# Patient Record
Sex: MALE | Race: ASIAN | NOT HISPANIC OR LATINO | Employment: STUDENT | URBAN - METROPOLITAN AREA
[De-identification: names, ages, dates, MRNs, and addresses within clinical notes are randomized per-mention and may not be internally consistent; named-entity substitution may affect disease eponyms.]

---
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
## 2020-01-02 LAB — 25(OH)D3 SERPL-MCNC: 16.3 NG/ML (ref 30–80)

## 2020-01-03 NOTE — RESULT ENCOUNTER NOTE
Please mail labs to patient with this message.    Jareth Hernandez  I'm covering for Dr Batista while he is out of the office.  Your vitamin D level is low, and I do recommend restarting vitamin d 2000 units daily.  A prescription has been sent to your pharmacy for this medication.  It would be reasonable to recheck this again in 2-3 months to make sure that the level is coming up.  OSCAR Young MD

## 2020-01-21 ENCOUNTER — OFFICE VISIT (OUTPATIENT)
Dept: FAMILY MEDICINE | Facility: CLINIC | Age: 20
End: 2020-01-21
Payer: COMMERCIAL

## 2020-01-21 VITALS
TEMPERATURE: 98.7 F | DIASTOLIC BLOOD PRESSURE: 83 MMHG | HEART RATE: 76 BPM | HEIGHT: 64 IN | RESPIRATION RATE: 16 BRPM | WEIGHT: 130.2 LBS | BODY MASS INDEX: 22.23 KG/M2 | SYSTOLIC BLOOD PRESSURE: 121 MMHG | OXYGEN SATURATION: 97 %

## 2020-01-21 DIAGNOSIS — G40.509 NONINTRACTABLE EPILEPSY DUE TO EXTERNAL CAUSES, WITHOUT STATUS EPILEPTICUS (H): ICD-10-CM

## 2020-01-21 DIAGNOSIS — R50.9 FEBRILE ILLNESS: Primary | ICD-10-CM

## 2020-01-21 LAB
FLUAV AG UPPER RESP QL IA.RAPID: NEGATIVE
FLUBV AG UPPER RESP QL IA.RAPID: NEGATIVE

## 2020-01-21 RX ORDER — IBUPROFEN 200 MG
400 TABLET ORAL EVERY 6 HOURS PRN
Qty: 30 TABLET | Refills: 0 | Status: SHIPPED | OUTPATIENT
Start: 2020-01-21 | End: 2020-02-11

## 2020-01-21 RX ORDER — DEXTROMETHORPHAN POLISTIREX 30 MG/5ML
60 SUSPENSION ORAL AT BEDTIME
Qty: 89 ML | Refills: 0 | Status: SHIPPED | OUTPATIENT
Start: 2020-01-21 | End: 2020-02-11

## 2020-01-21 RX ORDER — DIAZEPAM ORAL SOLUTION (CONCENTRATE) 5 MG/ML
SOLUTION ORAL
Qty: 30 ML | Refills: 5 | Status: SHIPPED | OUTPATIENT
Start: 2020-01-21 | End: 2021-07-26

## 2020-01-21 ASSESSMENT — MIFFLIN-ST. JEOR: SCORE: 1520.55

## 2020-01-21 NOTE — PROGRESS NOTES
"Preceptor attestation:  Vital signs reviewed: /83   Pulse 76   Temp 98.7  F (37.1  C) (Oral)   Resp 16   Ht 1.632 m (5' 4.25\")   Wt 59.1 kg (130 lb 3.2 oz)   SpO2 97%   BMI 22.18 kg/m      Patient seen, evaluated, and discussed with the resident.  I have verified the content of the note, which accurately reflects my assessment of the patient and the plan of care.    Supervising physician: Saba Martinez MD  [facility]  "

## 2020-01-21 NOTE — NURSING NOTE
Due to patient being non-English speaking/uses sign language, an  was used for this visit. Only for face-to-face interpretation by an external agency, date and length of interpretation can be found on the scanned worksheet.     name:  Darius Leal  Agency: [employer]  Language: Sherron  Telephone number:   [phone]  Type of interpretation:  Face-to-face, Spoken
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
## 2020-01-21 NOTE — PATIENT INSTRUCTIONS
Jareth Hernandez,    Thank you for coming in today!  Your symptoms are likely caused by a virus.      1)  Continue Tylenol as needed.  2)  Ibuprofen 400 mg (2 tablets) every 6 hours with food for fever and headache.  3)  Delsym liquid 10 mL's at bedtime to help with cough.    Return in no improvement in 3-4 days.    Thank you again!    Sincerely,    Dr. Mitchell

## 2020-01-22 NOTE — RESULT ENCOUNTER NOTE
Discussed negative Flu swab with patient in clinic.  No further action required at this time.    Dr. Mitchell

## 2020-01-28 ENCOUNTER — OFFICE VISIT (OUTPATIENT)
Dept: FAMILY MEDICINE | Facility: CLINIC | Age: 20
End: 2020-01-28
Payer: COMMERCIAL

## 2020-01-28 VITALS
RESPIRATION RATE: 20 BRPM | BODY MASS INDEX: 20.59 KG/M2 | HEART RATE: 64 BPM | TEMPERATURE: 98.6 F | SYSTOLIC BLOOD PRESSURE: 117 MMHG | OXYGEN SATURATION: 99 % | HEIGHT: 64 IN | DIASTOLIC BLOOD PRESSURE: 74 MMHG | WEIGHT: 120.6 LBS

## 2020-01-28 DIAGNOSIS — J02.0 STREP THROAT: ICD-10-CM

## 2020-01-28 DIAGNOSIS — M54.50 CHRONIC MIDLINE LOW BACK PAIN WITHOUT SCIATICA: ICD-10-CM

## 2020-01-28 DIAGNOSIS — G40.509 NONINTRACTABLE EPILEPSY DUE TO EXTERNAL CAUSES, WITHOUT STATUS EPILEPTICUS (H): ICD-10-CM

## 2020-01-28 DIAGNOSIS — S06.9X9D TRAUMATIC BRAIN INJURY WITH LOSS OF CONSCIOUSNESS, SUBSEQUENT ENCOUNTER: ICD-10-CM

## 2020-01-28 DIAGNOSIS — G44.209 TENSION HEADACHE: Primary | ICD-10-CM

## 2020-01-28 DIAGNOSIS — G89.29 CHRONIC MIDLINE LOW BACK PAIN WITHOUT SCIATICA: ICD-10-CM

## 2020-01-28 RX ORDER — AMOXICILLIN 500 MG/1
1000 CAPSULE ORAL 2 TIMES DAILY
Qty: 28 CAPSULE | Refills: 0 | Status: SHIPPED | OUTPATIENT
Start: 2020-01-28 | End: 2020-02-11

## 2020-01-28 ASSESSMENT — PAIN SCALES - GENERAL: PAINLEVEL: NO PAIN (0)

## 2020-01-28 ASSESSMENT — MIFFLIN-ST. JEOR: SCORE: 1473.91

## 2020-01-28 NOTE — PROGRESS NOTES
"Subjective: Jareth Hernandez is a 19 year old who presents today for a few new concerns.  He has ST and cough for a few days.  It hurts to swallow solids and liquids.  This been going on for a day or 2.  He is also had chills and subjective fever.  He has not had a cough.  He has low back pain that comes and goes for a week.  He continues to have HAs and he takes Excedrin for this, one pill once a day.  It helps a little bit.  He has a significant acquired intellectual disability because of this as well as a chronic seizure disorder.  He takes Trileptal 3 tablets (a total of 900 mg)  twice a day.  On this regimen he has not had a seizure since May.  He states that he misses a dose maybe once a week.    He has not been able to get his diazepam refilled and the family is not sure why.  They have been the pharmacy twice now trying to pick this up.    PMHX/PSHX/MEDS/ALLERGIES/SHX/FHX reviewed and updated in Epic.   ROS:   General: No fevers, chills   Head: No headache   CV: No chest pain or palpitations.   Resp: No shortness of breath. No cough. No hemoptysis.   GI: No nausea or vomiting.  No constipation or diarrhea.  Objective: /74   Pulse 64   Temp 98.6  F (37  C) (Oral)   Resp 20   Ht 1.627 m (5' 4.06\")   Wt 54.7 kg (120 lb 9.6 oz)   SpO2 99%   BMI 20.67 kg/m     Gen: Well nourished and in NAD   HEENT: TMs normal color and landmarks, nasopharynx shows clear drainage.  Oropharynx shows tonsillitis with pus pockets noted in the tonsillar crypts.  Neck is supple with right-sided upper cervical lymphadenopathy.  CV: RRR - no murmurs, rubs, or gallups  Pulm: Clear to auscultation without wheezing or crackles  ABD: soft, nontender, BS intact  No rashes on inspection.   Patient has full flexion and extension bilaterally.  There is full rotatory and side flexion bilaterally.No spinous or paraspinous tenderness.  No sciatic notch tenderness.  SLR is negative bilaterally.  Sensation is intact to light touch throughout.  " Motor strength is 5/5 bilaterally with hip flexion and extension and at EHL.   Extrem: no cyanosis, edema or clubbing   Psych: Euthymic    Assessment/ Plan:  It appears that he has strep throat.  Clinically he meets all of the CENTOR criteria.  Will treat empirically.    The low back pain seems to be functional.  We will refer for physical therapy.    The headaches seem to be escalating.  We will have him fill out a headache diary for the next 2 weeks and then follow-up.  At that point we can determine if we want to try another abortive therapy such as a triptan or even consider daily prophylactic therapy.    From a seizure standpoint he is doing well.  He will continue the Trileptal at the current dosage.    1. Tension headache    2. Traumatic brain injury with loss of consciousness, subsequent encounter    3. 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 Chronic midline low back pain without sciatica  - PHYSICAL THERAPY REFERRAL; Future    5. Strep throat  - amoxicillin (AMOXIL) 500 MG capsule; Take 2 capsules (1,000 mg) by mouth 2 times daily for 7 days  Dispense: 28 capsule; Refill: 0      Total of 40 minutes was spent in face to face contact with patient with > 50% in counseling about his headaches and coordination of care.  Options for treatment and/or follow-up care were reviewed with the patient. Jareth Hernandez was engaged and actively involved in the decision making process. He verbalized understanding of the options discussed and was satisfied with the final plan.    RTC in 2 weeks for follow up or sooner if develops new or worsening symptoms.    Eriberto Batista MD

## 2020-01-28 NOTE — NURSING NOTE
Chief Complaint   Patient presents with     RECHECK     F/U on Headaches and Medication Refills Needed. Patient reports having fever and vomiting for the last 2-3 days      Brennan Moura CMA    Due to patient being non-English speaking/uses sign language, an  was used for this visit. Only for face-to-face interpretation by an external agency, date and length of interpretation can be found on the scanned worksheet.     name: Darius Leal  Agency: [employer]  Language: Sherron   Telephone number: [phone]  Type of interpretation: Group, spoken; number of participants: 2     Brennan Moura CMA

## 2020-01-28 NOTE — PATIENT INSTRUCTIONS
PHYSICAL THERAPY REFERRAL   January 28, 2020 Demographics and referral for Physical Therapy faxed to [facility] at [phone].     [facility]  Phone: [phone]  Scheduling Hours: Monday - Friday, 7 am to 4:30 pm    [facility]   [address] [place] [address], [address]  [place] [address]       Appointment: Thursday February 6, 2020  Arrival: 3:45 pm  Provider: Shannon Kiran     time: 2:45 pm- 3:15 pm     Will call for after the appointment.    Confirmation #  [id number]

## 2020-01-29 ENCOUNTER — AMBULATORY - HEALTHEAST (OUTPATIENT)
Dept: ADMINISTRATIVE | Facility: REHABILITATION | Age: 20
End: 2020-01-29

## 2020-01-29 DIAGNOSIS — M54.50 CHRONIC MIDLINE LOW BACK PAIN WITHOUT SCIATICA: ICD-10-CM

## 2020-01-29 DIAGNOSIS — G89.29 CHRONIC MIDLINE LOW BACK PAIN WITHOUT SCIATICA: ICD-10-CM

## 2020-02-06 ENCOUNTER — OFFICE VISIT - HEALTHEAST (OUTPATIENT)
Dept: PHYSICAL THERAPY | Facility: REHABILITATION | Age: 20
End: 2020-02-06

## 2020-02-06 ENCOUNTER — TRANSFERRED RECORDS (OUTPATIENT)
Dept: HEALTH INFORMATION MANAGEMENT | Facility: CLINIC | Age: 20
End: 2020-02-06

## 2020-02-06 DIAGNOSIS — M54.50 ACUTE BILATERAL LOW BACK PAIN WITHOUT SCIATICA: ICD-10-CM

## 2020-02-10 ENCOUNTER — DOCUMENTATION ONLY (OUTPATIENT)
Dept: FAMILY MEDICINE | Facility: CLINIC | Age: 20
End: 2020-02-10

## 2020-02-10 ENCOUNTER — TRANSFERRED RECORDS (OUTPATIENT)
Dept: HEALTH INFORMATION MANAGEMENT | Facility: CLINIC | Age: 20
End: 2020-02-10

## 2020-02-10 NOTE — PROGRESS NOTES
To be completed in Nursing note:    Please reference list for forms that require a visit for completion.  Please remind patients that providers are given 3-5 business days to complete and return forms.      Form type: PT order     Date form received: 20    Date form completed by Physician:  20    How was form returned to patient (mailed, faxed, or at  for patient to ): faxed to [facility] [phone]    Date form mailed/faxed/left at  for patient and sent to HIM for scannin00..    Once form is left for patient, faxed, or mailed PCS will then close the documentation only encounter.

## 2020-02-11 ENCOUNTER — OFFICE VISIT (OUTPATIENT)
Dept: FAMILY MEDICINE | Facility: CLINIC | Age: 20
End: 2020-02-11
Payer: COMMERCIAL

## 2020-02-11 VITALS
WEIGHT: 121 LBS | TEMPERATURE: 98.4 F | SYSTOLIC BLOOD PRESSURE: 107 MMHG | DIASTOLIC BLOOD PRESSURE: 74 MMHG | HEIGHT: 64 IN | HEART RATE: 77 BPM | OXYGEN SATURATION: 99 % | BODY MASS INDEX: 20.66 KG/M2 | RESPIRATION RATE: 16 BRPM

## 2020-02-11 DIAGNOSIS — R63.4 WEIGHT LOSS: Primary | ICD-10-CM

## 2020-02-11 LAB
% GRANULOCYTES: 66.9 %G (ref 40–75)
ALBUMIN SERPL BCP-MCNC: 4.8 G/DL (ref 3.5–5)
ALP SERPL-CCNC: 118 U/L (ref 45–120)
ALT SERPL W/O P-5'-P-CCNC: 34 U/L (ref 0–45)
ANION GAP SERPL CALCULATED.3IONS-SCNC: 12 MMOL/L (ref 5–18)
AST SERPL-CCNC: 22 U/L (ref 0–40)
BILIRUB DIRECT SERPL-MCNC: 0.3 MG/DL (ref 0–0.5)
BILIRUB SERPL-MCNC: 0.8 MG/DL (ref 0–1)
BUN SERPL-MCNC: 16 MG/DL (ref 8–22)
CALCIUM SERPL-MCNC: 10.4 MG/DL (ref 8.5–10.5)
CHLORIDE SERPL-SCNC: 101 MMOL/L (ref 98–107)
CO2 SERPL-SCNC: 26 MMOL/L (ref 22–31)
CREAT SERPL-MCNC: 0.83 MG/DL (ref 0.7–1.3)
GLUCOSE SERPL-MCNC: 88 MG/DL (ref 70–125)
GRANULOCYTES #: 4 K/UL (ref 1.6–8.3)
HCT VFR BLD AUTO: 49 % (ref 40–53)
HEMOGLOBIN: 15.5 G/DL (ref 13.3–17.7)
LYMPHOCYTES # BLD AUTO: 1.7 K/UL (ref 0.8–5.3)
LYMPHOCYTES NFR BLD AUTO: 28.2 %L (ref 20–48)
MCH RBC QN AUTO: 29.7 PG (ref 26.5–35)
MCHC RBC AUTO-ENTMCNC: 31.6 G/DL (ref 32–36)
MCV RBC AUTO: 93.9 FL (ref 78–100)
MID #: 0.3 K/UL (ref 0–2.2)
MID %: 4.9 %M (ref 0–20)
PLATELET # BLD AUTO: 313 K/UL (ref 150–450)
POTASSIUM SERPL-SCNC: 4.1 MMOL/L (ref 3.5–5)
PROT SERPL-MCNC: 7.7 G/DL (ref 6–8)
RBC # BLD AUTO: 5.2 M/UL (ref 4.4–5.9)
SODIUM SERPL-SCNC: 139 MMOL/L (ref 136–145)
TSH SERPL DL<=0.05 MIU/L-ACNC: 1.72 UIU/ML (ref 0.3–5)
WBC # BLD AUTO: 6 K/UL (ref 4–11)

## 2020-02-11 ASSESSMENT — MIFFLIN-ST. JEOR: SCORE: 1466.91

## 2020-02-11 NOTE — PROGRESS NOTES
"Subjective: Jareth Hernandez is a 19 year old who presents today for a few new concerns.  He finished PT for his back pain and he is about 80% improved.  He continues to have HAs and he takes Excedrin for this, one pill once a day.  It helps a little bit.  He has a significant acquired intellectual disability because of this as well as a chronic seizure disorder.  He takes Trileptal 3 tablets (a total of 900 mg)  twice a day.  On this regimen he has not had a seizure since May.  He states that he misses a dose maybe once a week.      PMHX/PSHX/MEDS/ALLERGIES/SHX/FHX reviewed and updated in Epic.   ROS:   General: No fevers, chills   Head: No headache   CV: No chest pain or palpitations.   Resp: No shortness of breath. No cough. No hemoptysis.   GI: No nausea or vomiting.  No constipation or diarrhea.  Objective: /74 (BP Location: Left arm, Patient Position: Sitting, Cuff Size: Adult Regular)   Pulse 77   Temp 98.4  F (36.9  C) (Oral)   Resp 16   Ht 1.613 m (5' 3.5\")   Wt 54.9 kg (121 lb)   SpO2 99%   BMI 21.10 kg/m     Gen: Well nourished and in NAD   HEENT: TMs normal color and landmarks, nasopharynx shows clear drainage.  Oropharynx shows tonsillitis with pus pockets noted in the tonsillar crypts.  Neck is supple with right-sided upper cervical lymphadenopathy.  CV: RRR - no murmurs, rubs, or gallups  Pulm: Clear to auscultation without wheezing or crackles  ABD: soft, nontender, BS intact  No rashes on inspection.   Patient has full flexion and extension bilaterally.  There is full rotatory and side flexion bilaterally.No spinous or paraspinous tenderness.  No sciatic notch tenderness.  SLR is negative bilaterally.  Sensation is intact to light touch throughout.  Motor strength is 5/5 bilaterally with hip flexion and extension and at EHL.   Extrem: no cyanosis, edema or clubbing   Psych: Euthymic    Assessment/ Plan:  It appears that he has strep throat.  Clinically he meets all of the CENTOR criteria.  " Will treat empirically.    The low back pain seems to be functional.  We will refer for physical therapy.    The headaches seem to be escalating.  We will have him fill out a headache diary for the next 2 weeks and then follow-up.  At that point we can determine if we want to try another abortive therapy such as a triptan or even consider daily prophylactic therapy.    From a seizure standpoint he is doing well.  He will continue the Trileptal at the current dosage.    1. Tension headache    2. Traumatic brain injury with loss of consciousness, subsequent encounter    3. 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 Chronic midline low back pain without sciatica  - PHYSICAL THERAPY REFERRAL; Future    5. Strep throat  - amoxicillin (AMOXIL) 500 MG capsule; Take 2 capsules (1,000 mg) by mouth 2 times daily for 7 days  Dispense: 28 capsule; Refill: 0      Total of 40 minutes was spent in face to face contact with patient with > 50% in counseling about his headaches and coordination of care.  Options for treatment and/or follow-up care were reviewed with the patient. Jareth Hernandez was engaged and actively involved in the decision making process. He verbalized understanding of the options discussed and was satisfied with the final plan.    RTC in 2 weeks for follow up or sooner if develops new or worsening symptoms.    Eriberto Batista MD

## 2020-02-11 NOTE — LETTER
February 12, 2020      Htee Hser  [address]  [place] [address]        Dear Htee,    Please see below for your test results.  These results are within the normal range for you.  I think you just need to eat more food to gain weight back!  Please follow up in the clinic as directed.     Resulted Orders   CBC with Diff Plt ([facility])   Result Value Ref Range    WBC 6.0 4.0 - 11.0 K/uL    Lymphocytes # 1.7 0.8 - 5.3 K/uL    % Lymphocytes 28.2 20.0 - 48.0 %L    Mid # 0.3 0.0 - 2.2 K/uL    Mid % 4.9 0.0 - 20.0 %M    GRANULOCYTES # 4.0 1.6 - 8.3 K/uL    % Granulocytes 66.9 40.0 - 75.0 %G    RBC 5.2 4.4 - 5.9 M/uL    Hemoglobin 15.5 13.3 - 17.7 g/dL    Hematocrit 49.0 40.0 - 53.0 %    MCV 93.9 78.0 - 100.0 fL    MCH 29.7 26.5 - 35.0    MCHC 31.6 (L) 32.0 - 36.0 g/dL    Platelets 313.0 150.0 - 450.0 K/uL   Thyroid Weakley ([facility])   Result Value Ref Range    TSH 1.72 0.30 - 5.00 uIU/mL    Narrative    Test performed by:  [facility]  [address] [place] [address]   Hepatic Profile ([facility])   Result Value Ref Range    Bilirubin Total 0.8 0.0 - 1.0 mg/dL    Bilirubin Direct 0.3 <=0.5 mg/dL    Protein Total 7.7 6.0 - 8.0 g/dL    Albumin 4.8 3.5 - 5.0 g/dL    Alkaline Phosphatase 118 45 - 120 U/L    AST (SGOT) 22 0 - 40 U/L    ALT (SGPT) 34 0 - 45 U/L    Narrative    Test performed by:  [id number]  [address] [place] [address]   Basic Metabolic Profile ([facility])   Result Value Ref Range    Sodium 139 136 - 145 mmol/L    Potassium 4.1 3.5 - 5.0 mmol/L    Chloride 101 98 - 107 mmol/L    CO2, Total 26 22 - 31 mmol/L    Anion Gap 12 5 - 18 mmol/L    Glucose 88 70 - 125 mg/dL    Calcium 10.4 8.5 - 10.5 mg/dL    Urea Nitrogen 16 8 - 22 mg/dL    Creatinine 0.83 0.70 - 1.30 mg/dL    GFR Estimate If Black >60 >60 mL/min/1.73m2    GFR Estimate >60 >60 mL/min/1.73m2    Narrative    Test performed by:  [facility]  [address] [place] [address]  Fasting Glucose reference  range is 70-99 mg/dL per  American Diabetes Association (ADA) guidelines.       If you have any questions, please call the clinic to make an appointment.    Sincerely,    Eriberto Batista MD

## 2020-02-11 NOTE — NURSING NOTE
Due to patient being non-English speaking/uses sign language, an  was used for this visit. Only for face-to-face interpretation by an external agency, date and length of interpretation can be found on the scanned worksheet.     name: Tami ([id number])  Agency: [employer]  Language: Sherron   Telephone number:   Type of interpretation: Telemedicine, spoken       full weight-bearing

## 2020-02-12 NOTE — RESULT ENCOUNTER NOTE
These results are within the normal range for you.  I think you just need to eat more food to gain weight back!  Please follow up in the clinic as directed.

## 2020-02-21 ENCOUNTER — TRANSFERRED RECORDS (OUTPATIENT)
Dept: HEALTH INFORMATION MANAGEMENT | Facility: CLINIC | Age: 20
End: 2020-02-21

## 2020-03-02 ENCOUNTER — OFFICE VISIT (OUTPATIENT)
Dept: FAMILY MEDICINE | Facility: CLINIC | Age: 20
End: 2020-03-02
Payer: COMMERCIAL

## 2020-03-02 VITALS
SYSTOLIC BLOOD PRESSURE: 123 MMHG | HEART RATE: 88 BPM | WEIGHT: 124.2 LBS | DIASTOLIC BLOOD PRESSURE: 82 MMHG | OXYGEN SATURATION: 97 % | BODY MASS INDEX: 21.66 KG/M2 | TEMPERATURE: 98.9 F | RESPIRATION RATE: 16 BRPM

## 2020-03-02 DIAGNOSIS — R50.9 FEVER, UNSPECIFIED FEVER CAUSE: Primary | ICD-10-CM

## 2020-03-02 DIAGNOSIS — G44.89 OTHER HEADACHE SYNDROME: ICD-10-CM

## 2020-03-02 LAB
FLUAV AG UPPER RESP QL IA.RAPID: NEGATIVE
FLUBV AG UPPER RESP QL IA.RAPID: NEGATIVE

## 2020-03-02 RX ORDER — ACETAMINOPHEN 325 MG/1
650 TABLET ORAL EVERY 6 HOURS PRN
Qty: 100 TABLET | Refills: 1 | Status: SHIPPED | OUTPATIENT
Start: 2020-03-02 | End: 2021-11-16

## 2020-03-02 NOTE — PROGRESS NOTES
"[facility]         SUBJECTIVE   Htee David is a 19 year old male with a PMH of:  Patient Active Problem List   Diagnosis     Right inguinal hernia     Vitiligo     Epilepsy (H)     Traumatic brain injury with loss of consciousness, subsequent encounter     Acquired intellectual disability     presenting to clinic today with a chief complaint of a 1 day history of subjective fever.  He also reports diffuse body aches.  He also notes \"pain in the back of his head.\"  He has no known sick contacts.  He also notes a 1 day history of nasal congestion.  He denies any sore throat, cough, abdominal pain, nausea, vomiting, or diarrhea.  He last took Tylenol at 8 AM.    PMH, Medications and Allergies were reviewed and updated as needed.    ROS:  Per HPI.    Current Outpatient Medications   Medication Sig Dispense Refill     acetaminophen (TYLENOL) 325 MG tablet Take 1 tablet (325 mg) by mouth every 6 hours as needed for mild pain, fever or headaches 30 tablet 0     aspirin-acetaminophen-caffeine (EXCEDRIN MIGRAINE) 250-250-65 MG tablet Take 1 tablet by mouth every 6 hours as needed for headaches 80 tablet 3     benzoyl peroxide (ACNE-CLEAR) 10 % external gel Apply topically daily 60 g 0     diazepam (VALIUM) 5 MG/ML (HIGH CONC) solution Take 2 ml buccally as needed for seizures greater than 3 minutes.  Please provide 1 prescription for home and 1 for school. 30 mL 5     ibuprofen (ADVIL/MOTRIN) 600 MG tablet Take 1 tablet (600 mg) by mouth every 6 hours as needed for moderate pain 60 tablet 0     OXcarbazepine (TRILEPTAL) 300 MG tablet Take 3 tablets (900 mg) by mouth 2 times daily 540 tablet 3     sertraline (ZOLOFT) 50 MG tablet Take 1 tablet (50 mg) by mouth daily 90 tablet 3     vitamin D3 (CHOLECALCIFEROL) 2000 units (50 mcg) tablet Take 1 tablet (2,000 Units) by mouth daily 90 tablet 3            OBJECTIVE:       Vitals:   Vitals:    03/02/20 1513   BP: 123/82   BP Location: Left arm   Patient " Position: Sitting   Cuff Size: Adult Regular   Pulse: 88   Resp: 16   Temp: 98.9  F (37.2  C)   TempSrc: Oral   SpO2: 97%   Weight: 56.3 kg (124 lb 3.2 oz)     BMI: Body mass index is 21.66 kg/m .    Gen:  Well nourished and in no acute distress  HEENT: Extraocular movement intact.  Neck: Supple without lymphadenopathy  CV:  RRR  - no murmurs noted   Pulm:  CTAB, no wheezes or crackles noted, good air entry   ABD: Soft, nontender, no masses, no rebound, BS intact throughout, no hepatosplenomegaly  Extrem: No cyanosis, edema or clubbing  Psych: Euthymic           ASSESSMENT and PLAN:   Jareth was seen today for fever and medication reconciliation.    Diagnoses and all orders for this visit:    Fever, unspecified fever cause  -     Influenza A/B Antigen ([facility])    Other headache syndrome  -     acetaminophen (TYLENOL) 325 MG tablet; Take 2 tablets (650 mg) by mouth every 6 hours as needed for mild pain, fever or headaches    Rapid influenza testing was negative.  Suspect that he has a viral illness.  Encouraged fluid intake and rest.  Can use Tylenol and ibuprofen as needed.    Return to clinic in 1 week if symptoms are not improving.  Return sooner if develops new or worsening symptoms.    Options for treatment and/or follow-up care were reviewed with the patient was actively involved in the decision making process. Patient verbalized understanding and was in agreement with the plan.    The patient was seen by and discussed with Esdras Richardson MD.    Vianey Haed MD PGY2

## 2020-03-02 NOTE — PROGRESS NOTES
Preceptor Attestation:   Patient seen, evaluated and discussed with the resident. I have verified the content of the note, which accurately reflects my assessment of the patient and the plan of care.   Supervising Physician:  Keron Richardson MD.

## 2020-03-02 NOTE — NURSING NOTE
Due to patient being non-English speaking/uses sign language, an  was used for this visit. Only for face-to-face interpretation by an external agency, date and length of interpretation can be found on the scanned worksheet.       name:  Daruis Leal  Agency: [employer]  Language: Sherron  Telephone number:   [phone]  Type of interpretation:  Face-to-face, Spoken

## 2020-04-16 ENCOUNTER — TELEPHONE (OUTPATIENT)
Dept: FAMILY MEDICINE | Facility: CLINIC | Age: 20
End: 2020-04-16

## 2020-04-16 NOTE — TELEPHONE ENCOUNTER
Reached out to patient during COVID19 Clinic outreach.    Reassured patient that [facility] is still open and has started implementing phone and video appointments to help patient remain safe at home.     Patient reports the following concerns: None.    Per patient request, patient is scheduled for a visit to address their concerns on the following date: ASAP     Offered MyChart. Patient declined.    Note will be routed to [facility] to assist in addressing patient concerns and/or to schedule a visit.     Eriberto Batista MD

## 2020-05-06 ENCOUNTER — VIRTUAL VISIT (OUTPATIENT)
Dept: FAMILY MEDICINE | Facility: CLINIC | Age: 20
End: 2020-05-06
Payer: COMMERCIAL

## 2020-05-06 ENCOUNTER — TELEPHONE (OUTPATIENT)
Dept: FAMILY MEDICINE | Facility: CLINIC | Age: 20
End: 2020-05-06

## 2020-05-06 DIAGNOSIS — G40.509 NONINTRACTABLE EPILEPSY DUE TO EXTERNAL CAUSES, WITHOUT STATUS EPILEPTICUS (H): Primary | ICD-10-CM

## 2020-05-06 DIAGNOSIS — M54.2 NECK PAIN: ICD-10-CM

## 2020-05-06 DIAGNOSIS — S06.9X9D TRAUMATIC BRAIN INJURY WITH LOSS OF CONSCIOUSNESS, SUBSEQUENT ENCOUNTER: ICD-10-CM

## 2020-05-06 RX ORDER — IBUPROFEN 600 MG/1
600 TABLET, FILM COATED ORAL EVERY 6 HOURS PRN
Qty: 60 TABLET | Refills: 0 | Status: SHIPPED | OUTPATIENT
Start: 2020-05-06

## 2020-05-06 ASSESSMENT — PATIENT HEALTH QUESTIONNAIRE - PHQ9: SUM OF ALL RESPONSES TO PHQ QUESTIONS 1-9: 11

## 2020-05-06 NOTE — TELEPHONE ENCOUNTER
----- Message from Eriberto Batista MD sent at 5/6/2020  3:33 PM CDT -----  This patient needs to come in next month for a lab only visit to follow-up on this for his epilepsy medications.  He needs transportation set up for this.  Could you help with that?Thanks!DB
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
## 2020-05-06 NOTE — TELEPHONE ENCOUNTER
Spoke with patient's father with language line Sherron  [phone]. Friday mornings around 8am are best for them. He requested I call close to the time labs are needed. Will set a reminder to do so and set up a ride at that time. ./LR

## 2020-05-26 NOTE — TELEPHONE ENCOUNTER
Called patient and family with Sherron  [phone]. Scheduled for lab visit Friday morning (5/29). No COVID symptoms, aware not to come in if symptoms develop.    Ride set up with [facility] per request. Ride scheduled with [employer],  time 730-8am. ./LR

## 2020-11-06 ENCOUNTER — TRANSFERRED RECORDS (OUTPATIENT)
Dept: HEALTH INFORMATION MANAGEMENT | Facility: CLINIC | Age: 20
End: 2020-11-06

## 2020-11-27 ENCOUNTER — VIRTUAL VISIT (OUTPATIENT)
Dept: FAMILY MEDICINE | Facility: CLINIC | Age: 20
End: 2020-11-27
Payer: COMMERCIAL

## 2020-11-27 DIAGNOSIS — L70.9 ACNE, UNSPECIFIED ACNE TYPE: Primary | ICD-10-CM

## 2020-11-27 PROCEDURE — 99213 OFFICE O/P EST LOW 20 MIN: CPT | Mod: 95 | Performed by: STUDENT IN AN ORGANIZED HEALTH CARE EDUCATION/TRAINING PROGRAM

## 2020-11-27 NOTE — PROGRESS NOTES
"Family Medicine Telephone Visit Note           Telephone Visit Consent   Patient was verbally read the following and verbal consent was obtained.    \"Telephone visits are billed at different rates depending on your insurance coverage. During this emergency period, for some insurers they may be billed the same as an in-person visit.  Please reach out to your insurance provider with any questions.  If during the course of the call the physician/provider feels a telephone visit is not appropriate, you will not be charged for this service.\"    Name person giving consent:  Patient   Date verbal consent given:  11/27/2020  Time verbal consent given:  3:30 PM       Chief Complaint   Patient presents with     Derm Problem     have acne on the face per patient.     Medication Reconciliation     reviewed.       Due to patient being non-English speaking/uses sign language, an  was used for this visit. Only for face-to-face interpretation by an external agency, date and length of interpretation can be found on the scanned worksheet.     name: Tej Luna (Htoo)  Agency: [employer]  Language: Sherron   Telephone number: [phone]  Type of interpretation: Telephone, spoken         HPI   Patients name: Jareth  Appointment start time:  3:37pm    Jareth Hernandez is a 20-year-old male who presents by telephone to talk about acne.  He reports that his acne has been going on for about 1 year and is getting worse.  He describes the acne as red and bumpy on his face particularly his forehead, cheeks, and chin.  He thinks that his skin is more dry than oily.  He thinks that he does have some scars from the acne.  He is not currently using a face wash or any medication for the acne.  He is interested in a skin care regimen to treat the acne.  He denies any history of any skin problems.      Social Determinants of Health Screening  Food Insecurity:  Within the past 12 months, did you worry whether your food would run out before " "you would have money to buy more?  No    Within the past 12 months, did you find the food you bought just didn't last and/or you didn't have money to get more?  No    Financial Insecurity:  Because of COVID-19, many people are now eligible for programs such as unemployment and may qualify for a stimulus check from the  government.     Have you had trouble accessing these programs? No    If yes, would you be interested in speaking on the phone with a  who could help you with these programs?  No      Current Outpatient Medications   Medication Sig Dispense Refill     acetaminophen (TYLENOL) 325 MG tablet Take 2 tablets (650 mg) by mouth every 6 hours as needed for mild pain, fever or headaches 100 tablet 1     aspirin-acetaminophen-caffeine (EXCEDRIN MIGRAINE) 250-250-65 MG tablet Take 1 tablet by mouth every 6 hours as needed for headaches 80 tablet 3     benzoyl peroxide (ACNE-CLEAR) 10 % external gel Apply topically daily 60 g 0     diazepam (VALIUM) 5 MG/ML (HIGH CONC) solution Take 2 ml buccally as needed for seizures greater than 3 minutes.  Please provide 1 prescription for home and 1 for school. 30 mL 5     ibuprofen (ADVIL/MOTRIN) 600 MG tablet Take 1 tablet (600 mg) by mouth every 6 hours as needed for moderate pain (for back pain) 60 tablet 0     OXcarbazepine (TRILEPTAL) 300 MG tablet Take 3 tablets (900 mg) by mouth 2 times daily 540 tablet 3     sertraline (ZOLOFT) 50 MG tablet Take 1 tablet (50 mg) by mouth daily 90 tablet 3     vitamin D3 (CHOLECALCIFEROL) 2000 units (50 mcg) tablet Take 1 tablet (2,000 Units) by mouth daily 90 tablet 3     Allergies   Allergen Reactions     Aspirin      heartburn              Review of Systems:      ROS: 10 point ROS neg other than the symptoms noted above in the HPI.         Physical Exam:     There were no vitals taken for this visit.  Estimated body mass index is 21.66 kg/m  as calculated from the following:    Height as of 2/11/20: 1.613 m (5' 3.5\").    " Weight as of 3/2/20: 56.3 kg (124 lb 3.2 oz).    Exam:  Constitutional: healthy, alert and no distress  Psychiatric: mentation appears normal and affect normal/bright  Skin: via photos from the : Face with red papules and pustules diffusely covering face including forehead, chin, and cheeks          Assessment and Plan   1. Acne, unspecified acne type  Htee David appears to have papulopustular acne on his forehead, cheeks, and chin.  He does not currently have a treatment regimen, and is interested in treatment.  Discussed treatment options.  Advised that patient should wash his face with a gentle cleanser such as Cetaphil daily. We will start with oral doxycycline 100 mg daily for 3 to 6 months, topical benzyl peroxide 5% daily in the morning, and a topical retinoid in the evening.  Sent prescriptions as above.  May have difficulty getting insurance to cover the topical Retin-A.  Advised patient that he could also use the over-the-counter product Differin if insurance does not cover Retin-A, though Retin-A would be much preferred in his case.  Advised patient that if the topical retinoid causes his skin to be too red and dry, he can apply it every other day or every third day. Also discussed with patient that his skin may appear worse in about a month, the same in 2 months, and finally improved in about 3 months.  Advised the patient that he should continue with daily treatment even if he does not think things are improving.      Refilled medications that would be required in the next 3 months.     After Visit Information:  Will print and mail AVS     No follow-ups on file.    Appointment end time: 4:08 PM  This is a telephone visit that took 30 minutes.      Clinician location:  [facility]    Hilda Baptiste MD, PGY-1  I precepted today with Dr. Eriberto Batista.

## 2020-11-27 NOTE — PATIENT INSTRUCTIONS
Plan:  1. It was a pleasure seeing you in clinic today.  2. For your acne: wash your face daily with a gentle cleanser such as Cetaphil.    3. Apply benzoyl peroxide gel to your acne once a day in the morning after washing your face.   4. Take doxycyline pill every day for 3-6 months.  5. Apply Retin-A over your whole face at night before bed. If this causes your face to be dry and peel, you can use it every other night or every third night. If insurance does not cover this medication, you can try Differin gel which you can get at a pharmacy, [facility], or [facility].     Please schedule a clinic appointment as needed for any future concerns.      [employer] [facility]  Phone: [phone]  Address: [address] [place], [place] [id number]

## 2020-11-30 RX ORDER — TRETINOIN 0.25 MG/G
GEL TOPICAL AT BEDTIME
Qty: 45 G | Refills: 3 | Status: SHIPPED | OUTPATIENT
Start: 2020-11-30 | End: 2021-02-18

## 2020-11-30 RX ORDER — DOXYCYCLINE HYCLATE 100 MG
100 TABLET ORAL 2 TIMES DAILY
Qty: 90 TABLET | Refills: 1 | Status: SHIPPED | OUTPATIENT
Start: 2020-11-30 | End: 2021-02-18

## 2021-01-15 NOTE — LETTER
April 8, 2019      [facility]  [address]  [place] [address]      Dear [facility]:     The results from the testing done at your last visit show your seizure medicine is at the appropriate level in your blood.     Please call the clinic with any questions.   We look forward to seeing you at your next visit.       Resulted Orders   Oxcarbazepine ([facility])   Result Value Ref Range    Oxcarbazepine Metabolite (MHC) S 17 3 - 35 ug/mL      Comment:      INTERPRETIVE INFORMATION: Oxcarbazepine     Therapeutic range: 3-35 ug/mL.  Toxic: Greater than 40 ug/mL     This test measures monohydroxyoxcarbazepine (MHD). Adverse effects   may include dizziness, fatigue, nausea, headache, somnolence,   ataxia and tremor.     Test developed and characteristics determined by [facility]. See Compliance Statement B: [employer]/CS  Performed by [facility],  [address] [place] [id number] [phone]  [email or web address], Dami Sosa MD, Lab. Director      Narrative    Test performed by:  [facility] [facility]  [address] [place] [id number]       If you have any questions, please call the clinic to make an appointment.    Sincerely,    Loulou Short MD   The patient is a 31y Male complaining of weakness.

## 2021-02-03 ENCOUNTER — TELEPHONE (OUTPATIENT)
Dept: FAMILY MEDICINE | Facility: CLINIC | Age: 21
End: 2021-02-03

## 2021-02-03 NOTE — TELEPHONE ENCOUNTER
----- Message from Hilda Baptiste MD sent at 1/29/2021 12:55 PM CST -----  Please help schedule a follow up appointment for ee Hser for acne, sometime around the end of February or beginning of March. An in person visit would be great so we can recheck his acne, but virtual is ok if he is not comfortable coming in. He is Sherron-speaking and Tej Luna interpreted for his visit.     Thanks!  Hilda Baptiste

## 2021-02-18 ENCOUNTER — OFFICE VISIT (OUTPATIENT)
Dept: FAMILY MEDICINE | Facility: CLINIC | Age: 21
End: 2021-02-18
Payer: COMMERCIAL

## 2021-02-18 VITALS
HEART RATE: 68 BPM | SYSTOLIC BLOOD PRESSURE: 126 MMHG | RESPIRATION RATE: 18 BRPM | TEMPERATURE: 98.3 F | OXYGEN SATURATION: 100 % | DIASTOLIC BLOOD PRESSURE: 78 MMHG

## 2021-02-18 DIAGNOSIS — L70.9 ACNE, UNSPECIFIED ACNE TYPE: Primary | ICD-10-CM

## 2021-02-18 PROCEDURE — 99213 OFFICE O/P EST LOW 20 MIN: CPT | Mod: GC | Performed by: STUDENT IN AN ORGANIZED HEALTH CARE EDUCATION/TRAINING PROGRAM

## 2021-02-18 RX ORDER — TRETINOIN 0.25 MG/G
GEL TOPICAL AT BEDTIME
Qty: 45 G | Refills: 3 | Status: SHIPPED | OUTPATIENT
Start: 2021-02-18 | End: 2021-11-16

## 2021-02-18 RX ORDER — DOXYCYCLINE HYCLATE 100 MG
100 TABLET ORAL DAILY
Qty: 90 TABLET | Refills: 1 | Status: SHIPPED | OUTPATIENT
Start: 2021-02-18 | End: 2021-11-16

## 2021-02-18 ASSESSMENT — PATIENT HEALTH QUESTIONNAIRE - PHQ9: SUM OF ALL RESPONSES TO PHQ QUESTIONS 1-9: 0

## 2021-02-18 NOTE — NURSING NOTE
Due to patient being non-English speaking/uses sign language, an  was used for this visit. Only for face-to-face interpretation by an external agency, date and length of interpretation can be found on the scanned worksheet.     name: Teresita  Language: Sherron  Agency: [employer]  Phone number: [phone]  Type of interpretation: Telephone, spoken

## 2021-02-18 NOTE — PROGRESS NOTES
Assessment & Plan     Acne, unspecified acne type  Htmadhavi is a 20-year-old with papulopustular acne of his face, chest, and back. His acne on his face has improved in the last 3 months but has not resolved with a regimen including daily face washing, doxycycline, benzoyl peroxide wash, and Retin-A. Will continue daily doxycycline for 3 additional months. Discussed that he should use benzoyl peroxide in the morning and Retin-A at night. Follow up in 3 months.   - doxycycline hyclate (VIBRA-TABS) 100 MG tablet  Dispense: 90 tablet; Refill: 1  - benzoyl peroxide 5 % external liquid  Dispense: 148 g; Refill: 3  - tretinoin (RETIN-A) 0.025 % external gel  Dispense: 45 g; Refill: 3      Patient Instructions   Plan:  1. It was a pleasure seeing you in clinic today.  2. For your acne: Wash your face daily with a gentle cleanser like Cetaphil.  3. Continue taking doxycycline once daily for 3 more months.  4. In the morning, use benzoyl peroxide to wash your face, chest, and back. Rinse well. It can be helpful to use this when you shower.  5. In the evening, use Retin-A over all your face after you wash your face.    Please schedule a clinic appointment in 3 months for acne or sooner as needed for any future concerns.     [facility]  Phone: [phone]  Address: [address] [place] [address]        Diagnosis or treatment significantly limited by social determinants of health - limited income, language barrier and low health literacy      Return in about 3 months (around 5/18/2021).    Hilda Baptiste MD  [facility]    Subjective   Htee is a 20 year old who presents for the following health issues: follow up acne     HPI     Htee Hser was seen on 11/24/20 for a virtual visit regarding acne. Photos showed papulopustular acne on his forehead, cheeks, and chin. He was recommended to wash his face daily with gentle cleanser, take doxycycline 100 mg daily, use benzoyl peroxide 5%  in the morning, and use topical retinoid at night.     Today, he reports his acne has improved. He is happy with how his skin looks. His current skincare regimen includes washing his face everyday with warm water, no soap. He takes doxycycline regularly, forgets maybe 1-2 times per week. He uses benzoyl peroxide twice a day, finds it helpful. Does not experience red or dry skin. Using Retin-A twice a day. Does feel like this makes his face red and dry.     Review of Systems    ROS: 10 point ROS neg other than the symptoms noted above in the HPI.        Objective    /78 (BP Location: Left arm, Patient Position: Sitting, Cuff Size: Adult Regular)   Pulse 68   Temp 98.3  F (36.8  C) (Oral)   Resp 18   SpO2 100%   There is no height or weight on file to calculate BMI.  Physical Exam   General: alert, appears comfortable, no acute distress  HEENT: atraumatic, conjunctiva clear without erythema, EOM's intact, no nasal discharge, MMM  Neck: supple  Cardiac: normal rate and rhythm with no murmurs or extra sounds  Resp: lungs clear to auscultation bilaterally with no crackles or wheezes, no increased work of breathing  Skin: Mildly erythematous papules on forehead, chin, and cheeks, with some evidence of scarring on jawline. Erythematous papules on chest and back.  Neuro: CN's grossly intact  Psych: affect congruent with mood

## 2021-02-18 NOTE — PATIENT INSTRUCTIONS
Plan:  1. It was a pleasure seeing you in clinic today.  2. For your acne: Wash your face daily with a gentle cleanser like Cetaphil.  3. Continue taking doxycycline once daily for 3 more months.  4. In the morning, use benzoyl peroxide to wash your face, chest, and back. Rinse well. It can be helpful to use this when you shower.  5. In the evening, use Retin-A over all your face after you wash your face.    Please schedule a clinic appointment in 3 months for acne or sooner as needed for any future concerns.     [facility]  Phone: [phone]  Address: [address] [place] [address]

## 2021-02-18 NOTE — PROGRESS NOTES
Preceptor Attestation:    Patient seen and evaluated in person. I discussed the patient with the resident. I have verified the content of the note, which accurately reflects my assessment of the patient and the plan of care.   Supervising Physician:  Eriberto Batista MD.

## 2021-03-03 ENCOUNTER — OFFICE VISIT (OUTPATIENT)
Dept: FAMILY MEDICINE | Facility: CLINIC | Age: 21
End: 2021-03-03
Payer: COMMERCIAL

## 2021-03-03 VITALS
BODY MASS INDEX: 21.92 KG/M2 | HEART RATE: 80 BPM | SYSTOLIC BLOOD PRESSURE: 126 MMHG | TEMPERATURE: 98.3 F | RESPIRATION RATE: 16 BRPM | HEIGHT: 64 IN | WEIGHT: 128.4 LBS | DIASTOLIC BLOOD PRESSURE: 78 MMHG | OXYGEN SATURATION: 98 %

## 2021-03-03 DIAGNOSIS — G44.209 TENSION HEADACHE: ICD-10-CM

## 2021-03-03 DIAGNOSIS — G40.509 NONINTRACTABLE EPILEPSY DUE TO EXTERNAL CAUSES, WITHOUT STATUS EPILEPTICUS (H): Primary | ICD-10-CM

## 2021-03-03 DIAGNOSIS — R11.0 NAUSEA: ICD-10-CM

## 2021-03-03 PROCEDURE — 99214 OFFICE O/P EST MOD 30 MIN: CPT | Mod: GC | Performed by: STUDENT IN AN ORGANIZED HEALTH CARE EDUCATION/TRAINING PROGRAM

## 2021-03-03 RX ORDER — ONDANSETRON 4 MG/1
4 TABLET, ORALLY DISINTEGRATING ORAL EVERY 8 HOURS PRN
Qty: 30 TABLET | Refills: 1 | Status: SHIPPED | OUTPATIENT
Start: 2021-03-03 | End: 2021-11-16

## 2021-03-03 ASSESSMENT — MIFFLIN-ST. JEOR: SCORE: 1499.93

## 2021-03-03 NOTE — PROGRESS NOTES
Assessment & Plan     Tension headache  These are chronic for him. Although symptoms seem severe, they are unchanged from previous headache symptoms. Unchanged neuro exam. Recommended follow up with neurology below.   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   - NEUROLOGY ADULT REFERRAL; Future    Nausea  Related to headaches above.   - ondansetron (ZOFRAN-ODT) 4 MG ODT tab; Take 1 tablet (4 mg) by mouth every 8 hours as needed for nausea    Review of external notes as documented elsewhere in note  30 minutes spent on the date of the encounter doing chart review, history and exam, documentation and further activities as noted above      Return if symptoms worsen or fail to improve.    Leonela Lobo MD PGY2  [facility]    Charles Templeton is a 20 year old who presents for the following health issues  accompanied by his mother:    He has a history of TBI as a child and has a longstanding history of migraine headaches. Previously took excedrin migraine, which would help with headaches, however he ran out of medications. He's noticed a headache for the past 2 days. Describes it as a sharp pain on the right side of the back of his neck/head. Also endorses some nausea but no vomiting. Also endorses dizziness and occasional blurry vision. He states that all of these symptoms are consistent with headaches he's had in the past- no changes. No new head trauma.     Also has a history of seizures but no seizures in the past 1.5 years. He was following with pediatric neurology. However, when he was referred to adult neurology somehow ended up with an appointment far away that was difficult to get to. Today they would like a referral to a closer neurology clinic.       Review of  "Systems   Constitutional, HEENT, cardiovascular, pulmonary, gi and gu systems are negative, except as otherwise noted.      Objective    /78 (BP Location: Left arm, Patient Position: Sitting, Cuff Size: Adult Small)   Pulse 80   Temp 98.3  F (36.8  C) (Oral)   Resp 16   Ht 1.62 m (5' 3.78\")   Wt 58.2 kg (128 lb 6.4 oz)   SpO2 98%   BMI 22.19 kg/m    Body mass index is 22.19 kg/m .  Physical Exam   GENERAL: healthy, alert and no distress  NECK: no adenopathy, no asymmetry, masses, or scars and thyroid normal to palpation  RESP: lungs clear to auscultation - no rales, rhonchi or wheezes  CV: regular rate and rhythm, normal S1 S2, no S3 or S4, no murmur, click or rub, no peripheral edema and peripheral pulses strong  ABDOMEN: soft, nontender, no hepatosplenomegaly, no masses and bowel sounds normal  MS: no gross musculoskeletal defects noted, no edema  NEURO: grossly normal, cranial nerves 2-12 intact, previously noted weakness and sensory decrease of RUE          "

## 2021-03-04 NOTE — PROGRESS NOTES
Preceptor Attestation:    I discussed the patient with the resident and evaluated the patient in person. I have verified the content of the note, which accurately reflects my assessment of the patient and the plan of care.   Supervising Physician:  Mateo Ansari MD.

## 2021-03-08 NOTE — PATIENT INSTRUCTIONS
03/08/21   NEUROLOGY ADULT REFERRAL   [facility] [place]  Phone: [phone]  Fax: [phone]     Referral, demographics, office note and medication list faxed to [phone]. They will contact patient to schedule.     Suzanne Durbin
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
## 2021-06-18 NOTE — PATIENT INSTRUCTIONS - HE
"Patient Instructions by Celeste Marte PT at 2/6/2020  4:00 PM     Author: Celeste Marte PT Service: -- Author Type: Physical Therapist    Filed: 2/6/2020  4:27 PM Encounter Date: 2/6/2020 Status: Signed    : Celeste Marte PT (Physical Therapist)        LOWER TRUNK ROTATIONS - LTR    Lying on your back with your knees bent, gently move your knees side-to-side.      SINGLE KNEE TO CHEST STRETCH - SKTC    While Lying on your back,  hold your knee and gently pull it up towards your chest.    Alternate:        BRIDGING    While lying on your back, tighten your lower abdominals, squeeze your buttocks and then raise your buttocks off the floor/bed as creating a \"Bridge\" with your body.      QUADRUPED LEG LIFT    Start on hands and knees while keeping your spine flat and neutral.  Tighten abdominal muscles.  Raise leg back and hold 3-5 seconds.  Return to original position and alternate legs.              "

## 2021-06-21 ENCOUNTER — TELEPHONE (OUTPATIENT)
Dept: FAMILY MEDICINE | Facility: CLINIC | Age: 21
End: 2021-06-21

## 2021-06-22 NOTE — TELEPHONE ENCOUNTER
6/22/21:   Spoke with [facility]. They are booking out to August 2021. Scheduled:     Wednesday August 4, 2021 at 11:30am   Dr. Ezequiel Leonard MD  [facility]   [address], [place], [place], [address]  Arrive 15 minutes earlier and bring a photo ID.     Called patient's mom ([phone]) using an [employer]  and gave apt information. Requesting a letter with this info.     Informed that Jareth Hernandez previously received social security starting in 2016. States his SSDI stopped in 2020. He became a citizen in Sept of 2020. They did get a letter for this and sent in the requested information but never heard back.     Plan:   1. SW will send letter with apt information.   2. Cannot schedule ride as it is too far out, will create reminder in July to schedule ride.   3. Will consult with  [employer] regarding social security and citizenship.

## 2021-06-22 NOTE — TELEPHONE ENCOUNTER
Social Work Note:    Data and Intervention: SW used [employer]  and called patient's mom, Rogelio Puga, to follow up regarding concerns of not getting food stamps for some of the family. She has questions about the student EBT card through the school. Clarified that this is the P-EBT program for pandemic assistance. Did receive it in fall of 2020, but it should be a monthly income. Grandchildren did qualify for it. Schools her children attend are [employer], [employer], and [employer]. [facility] financial worker is Sulma ULRICH ([phone]). Case number is [id number]. This SW Called and left VM.     Mom was also concerned about a neurology apt for patient. Per chart review, Dr. Lobo put in referral on 3/3/21 and referral was sent over by referrals coordinator Suzanne on 3/8/21. Mom confirms that radiology called and scheduled a visit, but the family missed the visit. Would like to reschedule. Is very concerned about this.     NEUROLOGY ADULT REFERRAL   [facility] [place]  Phone: [phone]  Fax: [phone]    SW called Neurology, was on hold for 15 minutes and then had to meet with another patient and ended call.     Assessment and Plan:   1. Will wait for call back from Financial Worker Sulma regarding P-EBT.   2. Will call tomorrow to get Htee Hser rescheduled with radiology.       DON Hernandez

## 2021-07-25 DIAGNOSIS — G40.509 NONINTRACTABLE EPILEPSY DUE TO EXTERNAL CAUSES, WITHOUT STATUS EPILEPTICUS (H): ICD-10-CM

## 2021-07-26 RX ORDER — DIAZEPAM ORAL SOLUTION (CONCENTRATE) 5 MG/ML
SOLUTION ORAL
Qty: 30 ML | Refills: 0 | Status: SHIPPED | OUTPATIENT
Start: 2021-07-26 | End: 2021-08-04

## 2021-07-28 NOTE — TELEPHONE ENCOUNTER
7/28/21:   Scheduled ride for Neurology apt with [employer]. Appointment information:     Wednesday August 4, 2021 at 11:30am   Dr. Ezequiel Leonard MD  [facility]   [address], [place], [address]  Arrive 15 minutes earlier and bring a photo ID.     Ride will be with [employer] ([phone]) with pickup 10:30-11:00am.     Called patient using [employer] . Relayed info to mom, Rogelio Edd. She requests this info in letter form. JAIME Thayer assisted in mailing this out.     Radha Serra LGSW

## 2021-08-04 ENCOUNTER — OFFICE VISIT (OUTPATIENT)
Dept: NEUROLOGY | Facility: CLINIC | Age: 21
End: 2021-08-04
Payer: COMMERCIAL

## 2021-08-04 VITALS
HEIGHT: 64 IN | WEIGHT: 119 LBS | SYSTOLIC BLOOD PRESSURE: 121 MMHG | DIASTOLIC BLOOD PRESSURE: 72 MMHG | HEART RATE: 85 BPM | BODY MASS INDEX: 20.32 KG/M2

## 2021-08-04 DIAGNOSIS — G40.509 NONINTRACTABLE EPILEPSY DUE TO EXTERNAL CAUSES, WITHOUT STATUS EPILEPTICUS (H): ICD-10-CM

## 2021-08-04 DIAGNOSIS — S06.9X9D TRAUMATIC BRAIN INJURY WITH LOSS OF CONSCIOUSNESS, SUBSEQUENT ENCOUNTER: Primary | ICD-10-CM

## 2021-08-04 DIAGNOSIS — R51.9 NONINTRACTABLE HEADACHE, UNSPECIFIED CHRONICITY PATTERN, UNSPECIFIED HEADACHE TYPE: ICD-10-CM

## 2021-08-04 PROCEDURE — 99205 OFFICE O/P NEW HI 60 MIN: CPT | Performed by: PSYCHIATRY & NEUROLOGY

## 2021-08-04 RX ORDER — DIAZEPAM ORAL SOLUTION (CONCENTRATE) 5 MG/ML
SOLUTION ORAL
Qty: 30 ML | Refills: 0 | Status: SHIPPED | OUTPATIENT
Start: 2021-08-04 | End: 2022-05-04

## 2021-08-04 RX ORDER — OXCARBAZEPINE 300 MG/1
900 TABLET, FILM COATED ORAL 2 TIMES DAILY
Qty: 540 TABLET | Refills: 3 | Status: SHIPPED | OUTPATIENT
Start: 2021-08-04 | End: 2021-11-01

## 2021-08-04 ASSESSMENT — MIFFLIN-ST. JEOR: SCORE: 1457.29
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
## 2021-08-04 NOTE — NURSING NOTE
Chief Complaint   Patient presents with     Seizures     Previously seen at [facility] for this condition
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
## 2021-08-05 ENCOUNTER — TELEPHONE (OUTPATIENT)
Dept: NEUROLOGY | Facility: CLINIC | Age: 21
End: 2021-08-05

## 2021-08-05 NOTE — TELEPHONE ENCOUNTER
[facility] lab, Allison calling in regards to pt's oxcarbazepine levels. Pt mentioned he has not been taking seizure medication for 3 weeks, as he has been out of this. Per visit with Dr. Vasquez yesterday, he mentioned he has not missed taking seizure meds. Lab will get  involved to confirm.   If he has not been taking medications, I did inform labs do not need to be drawn.     Gerardo Jose MA on 8/5/2021 at 2:24 PM

## 2021-08-05 NOTE — TELEPHONE ENCOUNTER
Incoming call from [facility] with Amy to confirm pt has not been taking medications for 3 weeks now and no labs were drawn today. Will follow up with patient in regards to next steps     Spoke with mom, from mom's understanding he has been taking this medication, but later said that they were told by his PCP that he would only be taking this when he has a seizure for more than 3 mins. Informed mom, this is the diazepam. He should be taking oxcarbazepine daily for seizures. There has been a language barrier between what medications he has been taking and not. Per pt, he has only been picking up diazepam and not oxcarbazepine, as there may not have been further refills on this. He has been out of this Rx for three weeks now. I asked him if he picked up oxcarbazepine yesterday, and he said he was only able to get the diazepam. Informed them that they should go back to the pharmacy to  Rx so he can start this medication tomorrow. Notified them that pt will then need to get his labs drawn in one month as well. No further questions.     Gerardo Jose MA on 8/5/2021 at 3:42 PM

## 2021-08-08 NOTE — LETTER
July 28, 2021      Jareth Hernandez  [address]  [place] [address]          Dear Jareth,        Here is the information for your upcoming neurology appointment:     Wednesday August 4, 2021 at 11:30am   Dr. Ezequiel Leonard MD  [facility]   [address], [place], [place], [address]    Arrive 15 minutes earlier and bring a photo ID.     Ride will be with [employer]. Their number is [phone]. They will pick you up between 10:30-11:00am. The  will speak Sherron. When you are done with the appointment, you gil call [employer] for the return ride.                 Thank you,      Radha Serra  , [facility]   [phone]          
    June 22, 2021      Jareth sallie  [address]  [place] [address]            Dear Jareth,     You have the following neurology appointment coming up:     Wednesday August 4, 2021 at 11:30am   Dr. Ezequiel Leonard MD  [facility]   [address] [facility], [place], [address]  Arrive 15 minutes earlier and bring a photo ID.   They will provide an .     A ride will be scheduled through your insurance. They will arrive about 10:00-10:30am and will call you when they arrive.     Call if you have any questions.           Sincerely,        Radha Serra  , [facility]   [phone]          
Fair

## 2021-09-27 ENCOUNTER — TELEPHONE (OUTPATIENT)
Dept: FAMILY MEDICINE | Facility: CLINIC | Age: 21
End: 2021-09-27

## 2021-09-27 NOTE — TELEPHONE ENCOUNTER
Patient has dentist appointment:    9/28/21 6:30 pm,  time 5:30-6  [facility] [address], [place] [address]  [employer], will call return.     ./LR

## 2021-10-26 ENCOUNTER — ANCILLARY PROCEDURE (OUTPATIENT)
Dept: NEUROLOGY | Facility: CLINIC | Age: 21
End: 2021-10-26
Attending: PSYCHIATRY & NEUROLOGY
Payer: COMMERCIAL

## 2021-10-26 DIAGNOSIS — G40.509 NONINTRACTABLE EPILEPSY DUE TO EXTERNAL CAUSES, WITHOUT STATUS EPILEPTICUS (H): ICD-10-CM

## 2021-10-26 PROCEDURE — 95816 EEG AWAKE AND DROWSY: CPT | Performed by: PSYCHIATRY & NEUROLOGY

## 2021-11-01 ENCOUNTER — OFFICE VISIT (OUTPATIENT)
Dept: NEUROLOGY | Facility: CLINIC | Age: 21
End: 2021-11-01
Attending: PSYCHIATRY & NEUROLOGY
Payer: COMMERCIAL

## 2021-11-01 VITALS
WEIGHT: 119 LBS | DIASTOLIC BLOOD PRESSURE: 77 MMHG | HEART RATE: 82 BPM | SYSTOLIC BLOOD PRESSURE: 126 MMHG | BODY MASS INDEX: 20.32 KG/M2 | HEIGHT: 64 IN

## 2021-11-01 DIAGNOSIS — R51.9 NONINTRACTABLE HEADACHE, UNSPECIFIED CHRONICITY PATTERN, UNSPECIFIED HEADACHE TYPE: ICD-10-CM

## 2021-11-01 DIAGNOSIS — G40.509 NONINTRACTABLE EPILEPSY DUE TO EXTERNAL CAUSES, WITHOUT STATUS EPILEPTICUS (H): Primary | ICD-10-CM

## 2021-11-01 DIAGNOSIS — S06.9X9D TRAUMATIC BRAIN INJURY WITH LOSS OF CONSCIOUSNESS, SUBSEQUENT ENCOUNTER: ICD-10-CM

## 2021-11-01 PROCEDURE — 99214 OFFICE O/P EST MOD 30 MIN: CPT | Performed by: PSYCHIATRY & NEUROLOGY

## 2021-11-01 RX ORDER — OXCARBAZEPINE 300 MG/1
900 TABLET, FILM COATED ORAL 2 TIMES DAILY
Qty: 540 TABLET | Refills: 3 | Status: SHIPPED | OUTPATIENT
Start: 2021-11-01 | End: 2022-03-18

## 2021-11-01 ASSESSMENT — MIFFLIN-ST. JEOR: SCORE: 1457.29
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
## 2021-11-01 NOTE — NURSING NOTE
Chief Complaint   Patient presents with     Seizures     Gerardo Jose MA on 11/1/2021 at 12:58 PM
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
## 2021-11-08 ENCOUNTER — LAB (OUTPATIENT)
Dept: LAB | Facility: HOSPITAL | Age: 21
End: 2021-11-08
Payer: COMMERCIAL

## 2021-11-08 DIAGNOSIS — G40.509 NONINTRACTABLE EPILEPSY DUE TO EXTERNAL CAUSES, WITHOUT STATUS EPILEPTICUS (H): ICD-10-CM

## 2021-11-08 LAB
ALBUMIN SERPL-MCNC: 4.6 G/DL (ref 3.5–5)
ALP SERPL-CCNC: 93 U/L (ref 45–120)
ALT SERPL W P-5'-P-CCNC: 29 U/L (ref 0–45)
ANION GAP SERPL CALCULATED.3IONS-SCNC: 7 MMOL/L (ref 5–18)
AST SERPL W P-5'-P-CCNC: 24 U/L (ref 0–40)
BASOPHILS # BLD AUTO: 0 10E3/UL (ref 0–0.2)
BASOPHILS NFR BLD AUTO: 1 %
BILIRUB SERPL-MCNC: 0.5 MG/DL (ref 0–1)
BUN SERPL-MCNC: 10 MG/DL (ref 8–22)
CALCIUM SERPL-MCNC: 10 MG/DL (ref 8.5–10.5)
CHLORIDE BLD-SCNC: 102 MMOL/L (ref 98–107)
CO2 SERPL-SCNC: 30 MMOL/L (ref 22–31)
CREAT SERPL-MCNC: 0.84 MG/DL (ref 0.7–1.3)
EOSINOPHIL # BLD AUTO: 0.1 10E3/UL (ref 0–0.7)
EOSINOPHIL NFR BLD AUTO: 2 %
ERYTHROCYTE [DISTWIDTH] IN BLOOD BY AUTOMATED COUNT: 12.3 % (ref 10–15)
GFR SERPL CREATININE-BSD FRML MDRD: >90 ML/MIN/1.73M2
GLUCOSE BLD-MCNC: 80 MG/DL (ref 70–125)
HCT VFR BLD AUTO: 47.5 % (ref 40–53)
HGB BLD-MCNC: 15.8 G/DL (ref 13.3–17.7)
IMM GRANULOCYTES # BLD: 0 10E3/UL
IMM GRANULOCYTES NFR BLD: 0 %
LYMPHOCYTES # BLD AUTO: 2 10E3/UL (ref 0.8–5.3)
LYMPHOCYTES NFR BLD AUTO: 40 %
MCH RBC QN AUTO: 30.2 PG (ref 26.5–33)
MCHC RBC AUTO-ENTMCNC: 33.3 G/DL (ref 31.5–36.5)
MCV RBC AUTO: 91 FL (ref 78–100)
MONOCYTES # BLD AUTO: 0.3 10E3/UL (ref 0–1.3)
MONOCYTES NFR BLD AUTO: 6 %
NEUTROPHILS # BLD AUTO: 2.5 10E3/UL (ref 1.6–8.3)
NEUTROPHILS NFR BLD AUTO: 51 %
NRBC # BLD AUTO: 0 10E3/UL
NRBC BLD AUTO-RTO: 0 /100
PLATELET # BLD AUTO: 276 10E3/UL (ref 150–450)
POTASSIUM BLD-SCNC: 4.4 MMOL/L (ref 3.5–5)
PROT SERPL-MCNC: 7.7 G/DL (ref 6–8)
RBC # BLD AUTO: 5.24 10E6/UL (ref 4.4–5.9)
SODIUM SERPL-SCNC: 139 MMOL/L (ref 136–145)
WBC # BLD AUTO: 4.9 10E3/UL (ref 4–11)

## 2021-11-08 PROCEDURE — 82040 ASSAY OF SERUM ALBUMIN: CPT

## 2021-11-08 PROCEDURE — 85025 COMPLETE CBC W/AUTO DIFF WBC: CPT

## 2021-11-08 PROCEDURE — 36415 COLL VENOUS BLD VENIPUNCTURE: CPT

## 2021-11-08 PROCEDURE — 82247 BILIRUBIN TOTAL: CPT

## 2021-11-08 PROCEDURE — 80183 DRUG SCRN QUANT OXCARBAZEPIN: CPT

## 2021-11-11 LAB — 10OH-CARBAZEPINE SERPL-MCNC: 17.7 UG/ML

## 2021-11-16 ENCOUNTER — OFFICE VISIT (OUTPATIENT)
Dept: FAMILY MEDICINE | Facility: CLINIC | Age: 21
End: 2021-11-16
Payer: COMMERCIAL

## 2021-11-16 VITALS
HEIGHT: 64 IN | HEART RATE: 83 BPM | TEMPERATURE: 98.2 F | BODY MASS INDEX: 20.86 KG/M2 | OXYGEN SATURATION: 99 % | DIASTOLIC BLOOD PRESSURE: 81 MMHG | SYSTOLIC BLOOD PRESSURE: 120 MMHG | RESPIRATION RATE: 16 BRPM | WEIGHT: 122.2 LBS

## 2021-11-16 DIAGNOSIS — S06.9X9D TRAUMATIC BRAIN INJURY WITH LOSS OF CONSCIOUSNESS, SUBSEQUENT ENCOUNTER: ICD-10-CM

## 2021-11-16 DIAGNOSIS — L70.9 ACNE, UNSPECIFIED ACNE TYPE: ICD-10-CM

## 2021-11-16 DIAGNOSIS — G40.509 NONINTRACTABLE EPILEPSY DUE TO EXTERNAL CAUSES, WITHOUT STATUS EPILEPTICUS (H): Primary | ICD-10-CM

## 2021-11-16 DIAGNOSIS — L70.0 ACNE VULGARIS: ICD-10-CM

## 2021-11-16 PROCEDURE — 99214 OFFICE O/P EST MOD 30 MIN: CPT | Performed by: FAMILY MEDICINE

## 2021-11-16 RX ORDER — TAZAROTENE 1 MG/G
GEL CUTANEOUS AT BEDTIME
Qty: 100 G | Refills: 3 | Status: SHIPPED | OUTPATIENT
Start: 2021-11-16 | End: 2022-03-18

## 2021-11-16 ASSESSMENT — MIFFLIN-ST. JEOR: SCORE: 1464.3

## 2021-11-16 NOTE — NURSING NOTE
Due to patient being non-English speaking/uses sign language, an  was used for this visit. Only for face-to-face interpretation by an external agency, date and length of interpretation can be found on the scanned worksheet.     name: Lavell Monte  Agency: [employer]  Language: Sherron   Telephone number: [phone]  Type of interpretation: Face-to-face, spoken

## 2021-11-16 NOTE — PROGRESS NOTES
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  His CMP and CBC are within normal limits.  We will continue on these current medications.    Traumatic brain injury with loss of consciousness, subsequent encounter  This certainly could affect his ability to drive.  If there are concerns when he is pursuing a 's license then I be happy to refer him to occupational therapy for driving evaluation.    Acne vulgaris    - tazarotene (TAZORAC) 0.1 % external gel; Apply topically At Bedtime    Diagnosis or treatment significantly limited by social determinants of health - Language barrier.  30 minutes spent on the date of the encounter doing chart review, patient visit and documentation       Eriberto Batista MD  [facility]    Options for treatment and/or follow-up care were reviewed with the patient. Jareth Hernandez was engaged and actively involved in the decision making process. He verbalized understanding of the options discussed and was satisfied with the final plan.    RTC in 1 year for follow up or sooner if develops new or worsening symptoms.    Subjective: Jareth Hernandez is a 21 year old   He still gests headaches 1-2 per month phone visit and Excedrin helps with that.  Senior in [employer] and hopes to work after finishing.  He works at [employer].  No seizures since 2018.  He does not use any tobacco products and he does not drink alcohol.  He identifies as heterosexual and is not in any sexual relationships with women.  He uses a seatbelt when he is in the car.  He is interested in pursuing a 's license; he has been delayed because of his significant head injury as a teenager.  However, he has been seizure-free now for several years and  possibly could operate a motor vehicle.  I suggested that if they have any concerns they could be evaluated by occupational therapy.  I offered to put in the referral  "but they want to wait and so actually trying to get a 's license.  PMHX/PSHX/MEDS/ALLERGIES/SHX/FHX reviewed and updated in Epic.   ROS:   General: He had a fever last night but it was not quantified.  He feels fine now.  Head: Rare headaches  CV: No chest pain   Resp: No shortness of breath. No cough. No hemoptysis.   GI: No nausea or vomiting.  No constipation or diarrhea.  Objective: /81 (BP Location: Left arm, Patient Position: Sitting, Cuff Size: Adult Regular)   Pulse 83   Temp 98.2  F (36.8  C) (Oral)   Resp 16   Ht 1.616 m (5' 3.62\")   Wt 55.4 kg (122 lb 3.2 oz)   SpO2 99%   BMI 21.23 kg/m     Gen: Well nourished and in NAD   CV: RRR - no murmurs, rubs, or gallups   Skin: Some moderate comedonal acne on the face  Pulm: Clear to auscultation without wheezing or crackles  ABD: soft, nontender, BS intact  Extrem: no cyanosis, edema or clubbing   Psych: Euthymic                 "

## 2021-12-03 ENCOUNTER — TELEPHONE (OUTPATIENT)
Dept: FAMILY MEDICINE | Facility: CLINIC | Age: 21
End: 2021-12-03
Payer: COMMERCIAL

## 2021-12-03 NOTE — TELEPHONE ENCOUNTER
I think they want clarification on site. For example apply to the face as needed or apply on back as needed

## 2021-12-15 ENCOUNTER — TELEPHONE (OUTPATIENT)
Dept: FAMILY MEDICINE | Facility: CLINIC | Age: 21
End: 2021-12-15
Payer: COMMERCIAL

## 2021-12-15 NOTE — TELEPHONE ENCOUNTER
Prior Authorization Retail Medication Request    Medication/Dose: tazarotene (TAZORAC) 0.1 % external gel  ICD code (if different than what is on RX):    Acne, unspecified acne type [L70.9]       Acne vulgaris [L70.0]           Previously Tried and Failed:    Rationale:      Insurance Name:  RENA MA  Insurance ID:  [id number]       Pharmacy Information (if different than what is on RX)  Name:  [facility]  Phone:    [phone]

## 2021-12-16 NOTE — TELEPHONE ENCOUNTER
PA Initiation    Medication: TAZORAC 0.1 % external gel- INITIATED  Insurance Company: [facility] - Phone [phone] Fax [phone]  Pharmacy Filling the Rx: [id number] - [place], [place] - [address]  Filling Pharmacy Phone: [phone]  Filling Pharmacy Fax: [phone]  Start Date: 12/16/2021

## 2021-12-16 NOTE — TELEPHONE ENCOUNTER
PRIOR AUTHORIZATION DENIED    Medication: TAZORAC 0.1 % external gel- NOT COVERED    Denial Date: 12/9/2021    Denial Rational: NOT COVERED      These may be covered with a PA: Tretinoin 0.01 % Gel, Tazarotene (Cream)      Appeal Information: N/A          Central Prior Authorization Team  Phone: [phone]

## 2022-03-18 ENCOUNTER — OFFICE VISIT (OUTPATIENT)
Dept: FAMILY MEDICINE | Facility: CLINIC | Age: 22
End: 2022-03-18
Payer: COMMERCIAL

## 2022-03-18 VITALS
WEIGHT: 127.8 LBS | SYSTOLIC BLOOD PRESSURE: 116 MMHG | OXYGEN SATURATION: 96 % | TEMPERATURE: 98.6 F | BODY MASS INDEX: 22.2 KG/M2 | DIASTOLIC BLOOD PRESSURE: 77 MMHG | RESPIRATION RATE: 16 BRPM | HEART RATE: 86 BPM

## 2022-03-18 DIAGNOSIS — L70.0 ACNE VULGARIS: ICD-10-CM

## 2022-03-18 DIAGNOSIS — L70.9 ACNE, UNSPECIFIED ACNE TYPE: ICD-10-CM

## 2022-03-18 DIAGNOSIS — Z20.822 EXPOSURE TO 2019 NOVEL CORONAVIRUS: Primary | ICD-10-CM

## 2022-03-18 DIAGNOSIS — G40.509 NONINTRACTABLE EPILEPSY DUE TO EXTERNAL CAUSES, WITHOUT STATUS EPILEPTICUS (H): ICD-10-CM

## 2022-03-18 PROCEDURE — 99213 OFFICE O/P EST LOW 20 MIN: CPT | Mod: GC | Performed by: STUDENT IN AN ORGANIZED HEALTH CARE EDUCATION/TRAINING PROGRAM

## 2022-03-18 PROCEDURE — U0003 INFECTIOUS AGENT DETECTION BY NUCLEIC ACID (DNA OR RNA); SEVERE ACUTE RESPIRATORY SYNDROME CORONAVIRUS 2 (SARS-COV-2) (CORONAVIRUS DISEASE [COVID-19]), AMPLIFIED PROBE TECHNIQUE, MAKING USE OF HIGH THROUGHPUT TECHNOLOGIES AS DESCRIBED BY CMS-2020-01-R: HCPCS | Performed by: STUDENT IN AN ORGANIZED HEALTH CARE EDUCATION/TRAINING PROGRAM

## 2022-03-18 PROCEDURE — U0005 INFEC AGEN DETEC AMPLI PROBE: HCPCS | Performed by: STUDENT IN AN ORGANIZED HEALTH CARE EDUCATION/TRAINING PROGRAM

## 2022-03-18 RX ORDER — OXCARBAZEPINE 300 MG/1
900 TABLET, FILM COATED ORAL 2 TIMES DAILY
Qty: 540 TABLET | Refills: 3 | Status: SHIPPED | OUTPATIENT
Start: 2022-03-18 | End: 2022-05-04

## 2022-03-18 RX ORDER — TAZAROTENE 1 MG/G
GEL CUTANEOUS AT BEDTIME
Qty: 100 G | Refills: 3 | Status: SHIPPED | OUTPATIENT
Start: 2022-03-18 | End: 2024-03-13

## 2022-03-18 NOTE — PROGRESS NOTES
Preceptor Attestation:  Vitals:    03/18/22 1348   BP: 116/77   Pulse: 86   Resp: 16   Temp: 98.6  F (37  C)   TempSrc: Oral   SpO2: 96%   Weight: 58 kg (127 lb 12.8 oz)          I discussed the patient with the resident and evaluated the patient in person. I have verified the content of the note, which accurately reflects my assessment of the patient and the plan of care.   Supervising Physician:  Amy Dominguez MD

## 2022-03-18 NOTE — NURSING NOTE
Due to patient being non-English speaking/uses sign language, an  was used for this visit. Only for face-to-face interpretation by an external agency, date and length of interpretation can be found on the scanned worksheet.     name: Brent Martin  Agency: [employer]  Language: Sherron   Telephone number: [phone]  Type of interpretation: Telephone, spoken

## 2022-03-19 LAB — SARS-COV-2 RNA RESP QL NAA+PROBE: NEGATIVE

## 2022-03-21 NOTE — PROGRESS NOTES
Assessment and Plan    Htee was seen today for refill request and covid 19 testing.  History of fever, 100.4 or 3 days.  No any other symptoms.  Father with a similar symptoms but tested negative for Covid.  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 Patient engaged in the decision making process and verbalized understanding of the options discussed and agreed with the final plan.    Patient was staffed with attending physician Dr.Newman Nataliya Sinclair MD PGY2           HPI       Htee Hser is a 21 year old year old male w/ PMH of   Patient Active Problem List   Diagnosis     Right inguinal hernia     Vitiligo     Epilepsy (H)     Traumatic brain injury with loss of consciousness, subsequent encounter     Acquired intellectual disability     Acne, unspecified acne type    who presents for fever of 100.4 for 3 days.  Fever associated with clear runny nose that was improved.  Denies chills, cough, myalgia, sore throat.  Also denies taste/smell change or diarrhea.  Father with similar symptoms, tested negative for Covid.  Patient received 2 doses of Covid vaccine.      A [employer]  was used for  this visit.    +++++++    Problem, Medication and Allergy Lists were   reviewed and updated if needed.     Patient Active Problem List    Diagnosis Date Noted     Acne, unspecified acne type 02/18/2021     Priority: Medium      Acquired intellectual disability 07/24/2019     Priority: Medium     Secondary to traumatic brain injury in March 2016.       Traumatic brain injury with loss of consciousness, subsequent encounter 11/13/2018     Priority: Medium     Epilepsy (H) 10/26/2017     Priority: Medium     Due to TBI, follows with mariajose and is on zonisamide as of 10/26/17.       Right inguinal hernia 07/15/2013     Priority: Medium     Vitiligo 07/15/2013     Priority: Medium       Patient is an established patient of this clinic.         Review of Systems:   10 point ROS negative other than as specified above.         Physical Exam:   /77   Pulse 86   Temp 98.6  F (37  C) (Oral)   Resp 16   Wt 58 kg (127 lb 12.8 oz)   SpO2 96%   BMI 22.20 kg/m      Vitals were reviewed and were normal     Exam:  Constitutional: healthy, alert, no distress, and cooperative  Head: Normocephalic. No masses, lesions, tenderness or abnormalities  Neck: Neck supple. No adenopathy.  ENT: throat normal without erythema or exudate  Cardiovascular: RRR w/o audible murmur  Respiratory: bilateral clear lungs w/o wheezing, crackles or rhonchi; breathing comfortably on RA  Gastrointestinal: Abdomen soft, non-tender. BS normal.  : Deferred  Musculoskeletal: extremities normal- no gross deformities noted, gait normal, and normal muscle tone  Skin: no suspicious lesions or rashes  Neurologic: grossly normal CN; normal strength, sensation, & tone  Psychiatric: mentation appears normal and affect normal/bright        Results:      Results from this visit  Results for orders placed or performed in visit on 03/18/22   Symptomatic; Unknown COVID-19 Virus (Coronavirus) by PCR Nose     Status: Normal    Specimen: Nose; Swab   Result Value Ref Range    SARS CoV2 PCR Negative Negative    Narrative    Testing was performed using the Aptima SARS-CoV-2 Assay on the  [employer] System. Additional information about this  Emergency Use Authorization (EUA) assay  can be found via the Lab  Guide. This test should be ordered for the detection of SARS-CoV-2 in  individuals who meet SARS-CoV-2 clinical and/or epidemiological  criteria. Test performance is unknown in asymptomatic patients. This  test is for in vitro diagnostic use under the FDA EUA for  laboratories certified under CLIA to perform high complexity testing.  This test has not been FDA cleared or approved. A negative result  does not rule out the presence of PCR inhibitors in the specimen or  target RNA in concentration below the limit of detection for the  assay. The possibility of a false negative should be considered if  the patient's recent exposure or clinical presentation suggests  COVID-19. This test was validated by the [facility]. This laboratory is certified under  the Clinical Laboratory Improvement Amendments of 1988 (CLIA-88) as  qualified to perform high complexity laboratory testing.

## 2022-05-04 ENCOUNTER — OFFICE VISIT (OUTPATIENT)
Dept: NEUROLOGY | Facility: CLINIC | Age: 22
End: 2022-05-04
Payer: COMMERCIAL

## 2022-05-04 VITALS
HEART RATE: 78 BPM | WEIGHT: 128 LBS | BODY MASS INDEX: 22.68 KG/M2 | SYSTOLIC BLOOD PRESSURE: 112 MMHG | DIASTOLIC BLOOD PRESSURE: 78 MMHG | HEIGHT: 63 IN

## 2022-05-04 DIAGNOSIS — R51.9 NONINTRACTABLE HEADACHE, UNSPECIFIED CHRONICITY PATTERN, UNSPECIFIED HEADACHE TYPE: ICD-10-CM

## 2022-05-04 DIAGNOSIS — S06.9X9D TRAUMATIC BRAIN INJURY WITH LOSS OF CONSCIOUSNESS, SUBSEQUENT ENCOUNTER: Primary | ICD-10-CM

## 2022-05-04 DIAGNOSIS — G40.509 NONINTRACTABLE EPILEPSY DUE TO EXTERNAL CAUSES, WITHOUT STATUS EPILEPTICUS (H): ICD-10-CM

## 2022-05-04 PROCEDURE — 99214 OFFICE O/P EST MOD 30 MIN: CPT | Performed by: PSYCHIATRY & NEUROLOGY

## 2022-05-04 RX ORDER — OXCARBAZEPINE 300 MG/1
900 TABLET, FILM COATED ORAL 2 TIMES DAILY
Qty: 540 TABLET | Refills: 3 | Status: SHIPPED | OUTPATIENT
Start: 2022-05-04 | End: 2022-11-28

## 2022-05-04 RX ORDER — DIAZEPAM ORAL SOLUTION (CONCENTRATE) 5 MG/ML
SOLUTION ORAL
Qty: 30 ML | Refills: 0 | Status: SHIPPED | OUTPATIENT
Start: 2022-05-04 | End: 2023-05-24

## 2022-05-04 NOTE — NURSING NOTE
Chief Complaint   Patient presents with     RECHECK     Epilepsy        Frandy Blanca MA on 5/4/2022 at 12:07 PM
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
## 2022-08-26 PROBLEM — Z87.820 HISTORY OF TRAUMATIC BRAIN INJURY: Status: ACTIVE | Noted: 2022-08-26

## 2022-11-28 ENCOUNTER — OFFICE VISIT (OUTPATIENT)
Dept: FAMILY MEDICINE | Facility: CLINIC | Age: 22
End: 2022-11-28
Payer: COMMERCIAL

## 2022-11-28 VITALS
OXYGEN SATURATION: 100 % | SYSTOLIC BLOOD PRESSURE: 123 MMHG | HEIGHT: 64 IN | DIASTOLIC BLOOD PRESSURE: 81 MMHG | BODY MASS INDEX: 20.55 KG/M2 | RESPIRATION RATE: 18 BRPM | HEART RATE: 87 BPM | TEMPERATURE: 98.1 F | WEIGHT: 120.4 LBS

## 2022-11-28 DIAGNOSIS — G40.509 NONINTRACTABLE EPILEPSY DUE TO EXTERNAL CAUSES, WITHOUT STATUS EPILEPTICUS (H): ICD-10-CM

## 2022-11-28 DIAGNOSIS — H81.11 BENIGN PAROXYSMAL POSITIONAL VERTIGO OF RIGHT EAR: Primary | ICD-10-CM

## 2022-11-28 DIAGNOSIS — R51.9 NONINTRACTABLE HEADACHE, UNSPECIFIED CHRONICITY PATTERN, UNSPECIFIED HEADACHE TYPE: ICD-10-CM

## 2022-11-28 PROCEDURE — 99214 OFFICE O/P EST MOD 30 MIN: CPT | Mod: GC | Performed by: STUDENT IN AN ORGANIZED HEALTH CARE EDUCATION/TRAINING PROGRAM

## 2022-11-28 RX ORDER — ONDANSETRON 4 MG/1
4 TABLET, ORALLY DISINTEGRATING ORAL EVERY 8 HOURS PRN
Qty: 30 TABLET | Refills: 0 | Status: SHIPPED | OUTPATIENT
Start: 2022-11-28

## 2022-11-28 RX ORDER — OXCARBAZEPINE 300 MG/1
900 TABLET, FILM COATED ORAL 2 TIMES DAILY
Qty: 540 TABLET | Refills: 3 | Status: SHIPPED | OUTPATIENT
Start: 2022-11-28 | End: 2023-05-24

## 2022-11-28 RX ORDER — MECLIZINE HYDROCHLORIDE 25 MG/1
25 TABLET ORAL 3 TIMES DAILY PRN
Qty: 30 TABLET | Refills: 1 | Status: SHIPPED | OUTPATIENT
Start: 2022-11-28 | End: 2024-03-13

## 2022-11-28 ASSESSMENT — PATIENT HEALTH QUESTIONNAIRE - PHQ9: SUM OF ALL RESPONSES TO PHQ QUESTIONS 1-9: 5
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
## 2023-02-16 ENCOUNTER — OFFICE VISIT (OUTPATIENT)
Dept: FAMILY MEDICINE | Facility: CLINIC | Age: 23
End: 2023-02-16
Payer: COMMERCIAL

## 2023-02-16 ENCOUNTER — HOSPITAL ENCOUNTER (OUTPATIENT)
Dept: CT IMAGING | Facility: HOSPITAL | Age: 23
Discharge: HOME | End: 2023-02-16
Attending: FAMILY MEDICINE | Admitting: FAMILY MEDICINE
Payer: COMMERCIAL

## 2023-02-16 VITALS
BODY MASS INDEX: 20.22 KG/M2 | HEART RATE: 82 BPM | TEMPERATURE: 98.4 F | WEIGHT: 118.4 LBS | OXYGEN SATURATION: 100 % | HEIGHT: 64 IN | DIASTOLIC BLOOD PRESSURE: 81 MMHG | SYSTOLIC BLOOD PRESSURE: 126 MMHG | RESPIRATION RATE: 12 BRPM

## 2023-02-16 DIAGNOSIS — R30.0 DYSURIA: ICD-10-CM

## 2023-02-16 DIAGNOSIS — Z87.820 HISTORY OF TRAUMATIC BRAIN INJURY: ICD-10-CM

## 2023-02-16 DIAGNOSIS — R31.0 GROSS HEMATURIA: ICD-10-CM

## 2023-02-16 DIAGNOSIS — Z16.12 UTI DUE TO EXTENDED-SPECTRUM BETA LACTAMASE (ESBL) PRODUCING ESCHERICHIA COLI: ICD-10-CM

## 2023-02-16 DIAGNOSIS — R31.0 GROSS HEMATURIA: Primary | ICD-10-CM

## 2023-02-16 DIAGNOSIS — N39.0 UTI DUE TO EXTENDED-SPECTRUM BETA LACTAMASE (ESBL) PRODUCING ESCHERICHIA COLI: ICD-10-CM

## 2023-02-16 DIAGNOSIS — F79 ACQUIRED INTELLECTUAL DISABILITY: ICD-10-CM

## 2023-02-16 DIAGNOSIS — B96.29 UTI DUE TO EXTENDED-SPECTRUM BETA LACTAMASE (ESBL) PRODUCING ESCHERICHIA COLI: ICD-10-CM

## 2023-02-16 DIAGNOSIS — R30.0 DYSURIA: Primary | ICD-10-CM

## 2023-02-16 LAB
ALBUMIN UR-MCNC: 100 MG/DL
ANION GAP SERPL CALCULATED.3IONS-SCNC: 11 MMOL/L (ref 7–15)
APPEARANCE UR: CLEAR
BILIRUB UR QL STRIP: NEGATIVE
BUN SERPL-MCNC: 4.7 MG/DL (ref 6–20)
CALCIUM SERPL-MCNC: 9.5 MG/DL (ref 8.6–10)
CHLORIDE SERPL-SCNC: 100 MMOL/L (ref 98–107)
COLOR UR AUTO: YELLOW
CREAT SERPL-MCNC: 0.79 MG/DL (ref 0.67–1.17)
DEPRECATED HCO3 PLAS-SCNC: 29 MMOL/L (ref 22–29)
ERYTHROCYTE [DISTWIDTH] IN BLOOD BY AUTOMATED COUNT: 11.7 % (ref 10–15)
GFR SERPL CREATININE-BSD FRML MDRD: >90 ML/MIN/1.73M2
GLUCOSE SERPL-MCNC: 103 MG/DL (ref 70–99)
GLUCOSE UR STRIP-MCNC: NEGATIVE MG/DL
HCT VFR BLD AUTO: 42.1 % (ref 40–53)
HGB BLD-MCNC: 14.8 G/DL (ref 13.3–17.7)
HGB UR QL STRIP: ABNORMAL
KETONES UR STRIP-MCNC: NEGATIVE MG/DL
LEUKOCYTE ESTERASE UR QL STRIP: ABNORMAL
MCH RBC QN AUTO: 30.7 PG (ref 26.5–33)
MCHC RBC AUTO-ENTMCNC: 35.2 G/DL (ref 31.5–36.5)
MCV RBC AUTO: 87 FL (ref 78–100)
NITRATE UR QL: NEGATIVE
PH UR STRIP: 7 [PH] (ref 5–8)
PLATELET # BLD AUTO: 260 10E3/UL (ref 150–450)
POTASSIUM SERPL-SCNC: 3.9 MMOL/L (ref 3.4–5.3)
RBC # BLD AUTO: 4.82 10E6/UL (ref 4.4–5.9)
SODIUM SERPL-SCNC: 140 MMOL/L (ref 136–145)
SP GR UR STRIP: 1.01 (ref 1–1.03)
UROBILINOGEN UR STRIP-ACNC: 0.2 E.U./DL
WBC # BLD AUTO: 5.1 10E3/UL (ref 4–11)

## 2023-02-16 PROCEDURE — 87591 N.GONORRHOEAE DNA AMP PROB: CPT | Performed by: FAMILY MEDICINE

## 2023-02-16 PROCEDURE — 80048 BASIC METABOLIC PNL TOTAL CA: CPT | Performed by: FAMILY MEDICINE

## 2023-02-16 PROCEDURE — 87186 SC STD MICRODIL/AGAR DIL: CPT | Performed by: FAMILY MEDICINE

## 2023-02-16 PROCEDURE — 87086 URINE CULTURE/COLONY COUNT: CPT | Performed by: FAMILY MEDICINE

## 2023-02-16 PROCEDURE — 87491 CHLMYD TRACH DNA AMP PROBE: CPT | Performed by: FAMILY MEDICINE

## 2023-02-16 PROCEDURE — 99214 OFFICE O/P EST MOD 30 MIN: CPT | Performed by: FAMILY MEDICINE

## 2023-02-16 PROCEDURE — 85027 COMPLETE CBC AUTOMATED: CPT | Performed by: FAMILY MEDICINE

## 2023-02-16 PROCEDURE — 74176 CT ABD & PELVIS W/O CONTRAST: CPT

## 2023-02-16 PROCEDURE — 81003 URINALYSIS AUTO W/O SCOPE: CPT | Performed by: FAMILY MEDICINE

## 2023-02-16 PROCEDURE — 36415 COLL VENOUS BLD VENIPUNCTURE: CPT | Performed by: FAMILY MEDICINE

## 2023-02-16 RX ORDER — SULFAMETHOXAZOLE/TRIMETHOPRIM 800-160 MG
1 TABLET ORAL 2 TIMES DAILY
Qty: 20 TABLET | Refills: 0 | Status: SHIPPED | OUTPATIENT
Start: 2023-02-16 | End: 2023-02-20

## 2023-02-16 NOTE — PROGRESS NOTES
Jareth was seen today for urinary problem and medication reconciliation.    Diagnoses and all orders for this visit:    Gross hematuria  -     Basic metabolic panel; Future  -     CBC with platelets; Future  -     CT Abdomen Pelvis w/o Contrast; Future  -     Basic metabolic panel  -     CBC with platelets    Dysuria  -     UA reflex to Microscopic  -     Urine Culture  -     Chlamydia Trachomatis PCR  -     Neisseria gonorrhoeae PCR  -     Cancel: Urine Microscopic Exam  -     Discontinue: sulfamethoxazole-trimethoprim (BACTRIM DS) 800-160 MG tablet; Take 1 tablet by mouth 2 times daily for 10 days  -     CT Abdomen Pelvis w/o Contrast; Future    History of traumatic brain injury    Acquired intellectual disability    UTI due to extended-spectrum beta lactamase (ESBL) producing Escherichia coli  -     nitroFURantoin macrocrystal-monohydrate (MACROBID) 100 MG capsule; Take 1 capsule (100 mg) by mouth 2 times daily for 10 days      At the time of dictation, his urinalysis had returned showing both blood and white cells in his urine, suspicious for UTI.  However UTI is unusual in a 22-year-old male and I therefore considered the possibility of a renal calculus.  This would fit with his complaint of severe pain 2 days ago.    I elected to start empiric antibiotics for a possible UTI and would be guided by urine culture results.  I ordered a CT renal stone run and results are now available indicating that there is no signs of a kidney stone nor of any obstruction of the urinary outflow system.    We will follow-up with the remaining lab results and I promised the mother that I will call with an  once these are available.  We will plan to follow-up in about 1 week's time in clinic to make sure he is doing better.    Addendum: No  Renal anomaly, stones.  ESBL EColi UTI - stop TMP/SMX and instead take Macrodantin x 10 days based on sensitivities.    Subjective:  This is a 22-year-old not previously known to me.   "He attends today with his mom reporting that about 3 days ago he started noticing blood in his urine.  It is painful when he urinates.  Mom adds that there is a sore at the tip of his penis.  He reports that 2 days ago he had very severe pain in both lower quadrants that kept him awake most of the night.  It appeared to have resolved when he awoke the next morning.    Review of the records indicates that he has a traumatic brain injury which is resulted in some acquired intellectual disability.  However I recommended that mom leave the room so I can ask him some more personal questions and she agrees.    He reports that he has never been sexually active.  He acknowledges that he has masturbated on occasion but not recently and did not experience any trauma which could have caused his current symptoms.  He then agrees that his mom can return to join the encounter.    He does take an antiseizure medication and apparently has not had a seizure for 4 years.  He has a follow-up appointment with neurology in about 3 months time.  He is not working at present.    Objective:  /81 (BP Location: Left arm, Patient Position: Sitting, Cuff Size: Adult Regular)   Pulse 82   Temp 98.4  F (36.9  C) (Oral)   Resp 12   Ht 1.626 m (5' 4\")   Wt 53.7 kg (118 lb 6.4 oz)   SpO2 100%   BMI 20.32 kg/m    This is a 22-year-old who is in good physical condition.  On exam he does describe some tenderness on pounding of the renal angle bilaterally.  He also complains of some discomfort in his left lower and right lower quadrants.  I recommended examining his genitalia and he consents.  He is uncircumcised but the foreskin is easily retracted and the urethral meatus looks normal without any signs of trauma.  Both testicles are in the scrotum.    Results for orders placed or performed during the hospital encounter of 02/16/23   CT Abdomen Pelvis w/o Contrast     Status: None    Narrative    EXAM: CT ABDOMEN PELVIS W/O " CONTRAST  LOCATION: [facility]  DATE/TIME: 2/16/2023 3:34 PM    INDICATION: Abdominal pain.  COMPARISON: None.  TECHNIQUE: CT scan of the abdomen and pelvis was performed without IV contrast. Multiplanar reformats were obtained. Dose reduction techniques were used.  CONTRAST: None.    FINDINGS:   LOWER CHEST: Normal.    HEPATOBILIARY: Normal.    PANCREAS: Normal.    SPLEEN: Normal.    ADRENAL GLANDS: Normal.    KIDNEYS/BLADDER: No renal stones and no hydronephrosis. Normal bladder.    BOWEL: A few scattered diverticulosis in the sigmoid colon. Otherwise normal bowel loops. Normal appendix.    LYMPH NODES: Normal.    VASCULATURE: Unremarkable.    PELVIC ORGANS: Normal.    MUSCULOSKELETAL: Normal.      Impression    IMPRESSION:   1.  Normal kidneys and renal collecting systems. No renal stones. No hydronephrosis.    2.  Mild diverticulosis in the colon.     Results for orders placed or performed in visit on 02/16/23   UA reflex to Microscopic     Status: Abnormal   Result Value Ref Range    Color Urine Yellow Colorless, Straw, Light Yellow, Yellow    Appearance Urine Clear Clear    Glucose Urine Negative Negative mg/dL    Bilirubin Urine Negative Negative    Ketones Urine Negative Negative mg/dL    Specific Gravity Urine 1.010 1.005 - 1.030    Blood Urine Large (A) Negative    pH Urine 7.0 5.0 - 8.0    Protein Albumin Urine 100 (A) Negative mg/dL    Urobilinogen Urine 0.2 0.2, 1.0 E.U./dL    Nitrite Urine Negative Negative    Leukocyte Esterase Urine Large (A) Negative   Basic metabolic panel     Status: Abnormal   Result Value Ref Range    Sodium 140 136 - 145 mmol/L    Potassium 3.9 3.4 - 5.3 mmol/L    Chloride 100 98 - 107 mmol/L    Carbon Dioxide (CO2) 29 22 - 29 mmol/L    Anion Gap 11 7 - 15 mmol/L    Urea Nitrogen 4.7 (L) 6.0 - 20.0 mg/dL    Creatinine 0.79 0.67 - 1.17 mg/dL    Calcium 9.5 8.6 - 10.0 mg/dL    Glucose 103 (H) 70 - 99 mg/dL    GFR Estimate >90 >60 mL/min/1.73m2   CBC with  platelets     Status: Normal   Result Value Ref Range    WBC Count 5.1 4.0 - 11.0 10e3/uL    RBC Count 4.82 4.40 - 5.90 10e6/uL    Hemoglobin 14.8 13.3 - 17.7 g/dL    Hematocrit 42.1 40.0 - 53.0 %    MCV 87 78 - 100 fL    MCH 30.7 26.5 - 33.0 pg    MCHC 35.2 31.5 - 36.5 g/dL    RDW 11.7 10.0 - 15.0 %    Platelet Count 260 150 - 450 10e3/uL   Urine Culture     Status: Abnormal    Specimen: Urine, Midstream   Result Value Ref Range    Culture 10,000-50,000 CFU/mL Escherichia coli ESBL (A)        Susceptibility    Escherichia coli ESBL - EULA*     Ampicillin >=32 Resistant ug/mL     Piperacillin/Tazobactam <=4 Susceptible ug/mL     Cefazolin* >=64 Resistant ug/mL      * Cefazolin EULA breakpoints are for the treatment of uncomplicated urinary tract infections. For the treatment of systemic infections, please contact the laboratory for additional testing.     Cefoxitin <=4 Susceptible ug/mL     Ceftazidime  Resistant      Ceftriaxone  Resistant      Cefepime  Resistant      Meropenem* <=0.25 Susceptible ug/mL      * Enterobacterales that are susceptible to meropenem are usually susceptible to ertapenem.     Gentamicin <=1 Susceptible ug/mL     Tobramycin <=1 Susceptible ug/mL     Ciprofloxacin >=4 Resistant ug/mL     Levofloxacin >=8 Resistant ug/mL     Nitrofurantoin <=16 Susceptible ug/mL     Trimethoprim/Sulfamethoxazole >16/304 Resistant ug/mL     * ESBL (extended spectrum beta lactamase) producing organisms require contact precautions.   Chlamydia Trachomatis PCR     Status: Normal    Specimen: Urine, Voided   Result Value Ref Range    Chlamydia trachomatis Negative Negative   Neisseria gonorrhoeae PCR     Status: Normal    Specimen: Urine, Voided   Result Value Ref Range    Neisseria gonorrhoeae Negative Negative

## 2023-02-16 NOTE — RESULT ENCOUNTER NOTE
Noted.  Will discuss with patient and family through  when urine culture result becomes available.  Kevan Giordano MD

## 2023-02-17 LAB
BACTERIA UR CULT: ABNORMAL
C TRACH DNA SPEC QL NAA+PROBE: NEGATIVE
N GONORRHOEA DNA SPEC QL NAA+PROBE: NEGATIVE

## 2023-02-17 NOTE — RESULT ENCOUNTER NOTE
Discussed with patient over phone through  - treat for UTI, continue antibiotics. Follow up in 2 weeks.

## 2023-02-20 ENCOUNTER — TELEPHONE (OUTPATIENT)
Dept: FAMILY MEDICINE | Facility: CLINIC | Age: 23
End: 2023-02-20
Payer: COMMERCIAL

## 2023-02-20 RX ORDER — NITROFURANTOIN 25; 75 MG/1; MG/1
100 CAPSULE ORAL 2 TIMES DAILY
Qty: 20 CAPSULE | Refills: 0 | Status: SHIPPED | OUTPATIENT
Start: 2023-02-20 | End: 2023-03-02

## 2023-02-20 NOTE — RESULT ENCOUNTER NOTE
ESBL EColi - resistant to TMP/SMX.  Called through  [facility] to communicate to stop TMP/SMX and instead take Macrodantin x 10 days.  I doublechecked that there is no interaction with his seizure medication.  Switch follow up appointment till he is done with antibiotics in 10-14 days.

## 2023-02-20 NOTE — TELEPHONE ENCOUNTER
Telephone consult with ID, Dr Escobar:  Agreed that ESBL UTI is unusual in otherwise well 23 yo male.  Suggests continuing Macrobid x 5 days.  Then repeat UC - make sure to engage in proper sterile technique.  Follow up with ID if remains positive despite treatment.  Consider urology referral.

## 2023-02-21 NOTE — RESULT ENCOUNTER NOTE
Called patient  to assist in rescheduling patient's appointment.  did not , left message.Niya Oneil, CMA

## 2023-03-03 ENCOUNTER — OFFICE VISIT (OUTPATIENT)
Dept: FAMILY MEDICINE | Facility: CLINIC | Age: 23
End: 2023-03-03
Payer: COMMERCIAL

## 2023-03-03 VITALS
SYSTOLIC BLOOD PRESSURE: 120 MMHG | BODY MASS INDEX: 20.42 KG/M2 | TEMPERATURE: 98.3 F | HEART RATE: 95 BPM | OXYGEN SATURATION: 95 % | HEIGHT: 64 IN | WEIGHT: 119.6 LBS | DIASTOLIC BLOOD PRESSURE: 80 MMHG | RESPIRATION RATE: 16 BRPM

## 2023-03-03 DIAGNOSIS — L70.9 ACNE, UNSPECIFIED ACNE TYPE: ICD-10-CM

## 2023-03-03 DIAGNOSIS — R30.0 DYSURIA: Primary | ICD-10-CM

## 2023-03-03 LAB
ALBUMIN UR-MCNC: NEGATIVE MG/DL
APPEARANCE UR: CLEAR
BILIRUB UR QL STRIP: NEGATIVE
COLOR UR AUTO: YELLOW
GLUCOSE UR STRIP-MCNC: NEGATIVE MG/DL
HGB UR QL STRIP: NEGATIVE
KETONES UR STRIP-MCNC: NEGATIVE MG/DL
LEUKOCYTE ESTERASE UR QL STRIP: NEGATIVE
NITRATE UR QL: NEGATIVE
PH UR STRIP: 6.5 [PH] (ref 5–8)
SP GR UR STRIP: <=1.005 (ref 1–1.03)
UROBILINOGEN UR STRIP-ACNC: 0.2 E.U./DL

## 2023-03-03 PROCEDURE — 99213 OFFICE O/P EST LOW 20 MIN: CPT | Mod: GC | Performed by: STUDENT IN AN ORGANIZED HEALTH CARE EDUCATION/TRAINING PROGRAM

## 2023-03-03 PROCEDURE — 81003 URINALYSIS AUTO W/O SCOPE: CPT | Performed by: STUDENT IN AN ORGANIZED HEALTH CARE EDUCATION/TRAINING PROGRAM

## 2023-03-03 PROCEDURE — 87086 URINE CULTURE/COLONY COUNT: CPT | Performed by: STUDENT IN AN ORGANIZED HEALTH CARE EDUCATION/TRAINING PROGRAM

## 2023-03-03 RX ORDER — ADAPALENE 45 G/G
GEL TOPICAL AT BEDTIME
Qty: 45 G | Refills: 3 | Status: SHIPPED | OUTPATIENT
Start: 2023-03-03 | End: 2023-03-06

## 2023-03-03 NOTE — PROGRESS NOTES
"Preceptor attestation:  Vital signs reviewed: /80   Pulse 95   Temp 98.3  F (36.8  C) (Oral)   Resp 16   Ht 1.619 m (5' 3.74\")   Wt 54.3 kg (119 lb 9.6 oz)   SpO2 95%   BMI 20.70 kg/m      Patient seen, evaluated, and discussed with the resident.  I have verified the content of the note, which accurately reflects my assessment of the patient and the plan of care.    Supervising physician: Saba Martinez MD  [facility]  "

## 2023-03-03 NOTE — LETTER
March 9, 2023      [facility]  [address]  [place] [address]        Dear Mr.Mathewsallie,    Resulted Orders   UA reflex to Microscopic   Result Value Ref Range    Color Urine Yellow Colorless, Straw, Light Yellow, Yellow    Appearance Urine Clear Clear    Glucose Urine Negative Negative mg/dL    Bilirubin Urine Negative Negative    Ketones Urine Negative Negative mg/dL    Specific Gravity Urine <=1.005 1.005 - 1.030    Blood Urine Negative Negative    pH Urine 6.5 5.0 - 8.0    Protein Albumin Urine Negative Negative mg/dL    Urobilinogen Urine 0.2 0.2, 1.0 E.U./dL    Nitrite Urine Negative Negative    Leukocyte Esterase Urine Negative Negative    Narrative    Microscopic not indicated   Urine Culture   Result Value Ref Range    Culture <10,000 CFU/mL Urogenital yuli        The laboratory results show no more urinary tract infection, please still follow up with urology. Please let me know if you have any other questions or concerns.      Thank you,    Dr. Cobos, DO

## 2023-03-03 NOTE — PROGRESS NOTES
Assessment & Plan   Dysuria  Recent ESBL E coli UTI, does not appear to have any risk factors pre-disposing to UTIs. No hx of UTIs as a child. Unclear why he got a UTI of ESBL E coli.  Repeat UA and UC showing clearance of UTI, currently asymptomatic. Will send to urology for evaluation to see if needs further workup to determine etiology of UTI. RTC prn  - UA reflex to Microscopic  - Urine Culture  - Adult Urology  Referral    Acne, unspecified acne type  Discussed skin care routine, rx of Differin as below.   - adapalene (DIFFERIN) 0.1 % external gel  Dispense: 45 g; Refill: 3    Ordering of each unique test  Prescription drug management     Follow Up:  Return in about 2 months (around 5/3/2023) for acne .    Options for treatment and follow-up care were reviewed with the patient who was engaged and actively involved in the decision making process, verbalized understanding of the options discussed, and satisfied with the final plan.    Patient was staffed with supervising physician, Dr. Saba Martinez, who agrees with the findings and plan.     Pako Cobos DO, PGY-3  [facility]      Subjective   Htee Hser is a 22 year old year old male who presents for the following health issues   Past Medical History:   Diagnosis Date     Inguinal hernia, right      Seizures (H)      TBI (traumatic brain injury)     April 2016 - hospitalized for 2 months at [facility]     Vitiligo      Patient Active Problem List   Diagnosis     Right inguinal hernia     Vitiligo     Epilepsy (H)     Traumatic brain injury with loss of consciousness, subsequent encounter     Acquired intellectual disability     Acne, unspecified acne type     History of traumatic brain injury     Chief Complaint   Patient presents with     Other     F/U blood in urine.      Acne     Has acne on th back.       Hematuria:  Had ESBL e coli, finished macrobid  Symptoms of hematuria and dysuria improved    Back acne:   Previously on his face but  "now on his back        Review of Systems:   Constitutional, HEENT, cardiovascular, pulmonary, GI, , musculoskeletal, neuro, skin, endocrine and psych systems are negative, except as otherwise noted.     Objective     /80   Pulse 95   Temp 98.3  F (36.8  C) (Oral)   Resp 16   Ht 1.619 m (5' 3.74\")   Wt 54.3 kg (119 lb 9.6 oz)   SpO2 95%   BMI 20.70 kg/m    Body mass index is 20.7 kg/m .    Physical Exam  Constitutional:       General: He is not in acute distress.     Appearance: He is not diaphoretic.   Cardiovascular:      Rate and Rhythm: Normal rate and regular rhythm.      Pulses: Normal pulses.      Heart sounds: Normal heart sounds. No murmur heard.  Pulmonary:      Effort: Pulmonary effort is normal. No respiratory distress.      Breath sounds: Normal breath sounds.   Abdominal:      General: Abdomen is flat. Bowel sounds are normal. There is no distension.      Palpations: Abdomen is soft.      Tenderness: There is no abdominal tenderness. There is no right CVA tenderness or left CVA tenderness.   Skin:     Comments: Hyperpigmented spots on low back  Comedomal acne on face and back   Neurological:      Mental Status: He is alert.        ----- Service Performed and Documented by Resident or Fellow ------               "

## 2023-03-03 NOTE — PATIENT INSTRUCTIONS
Use the differin every night after cleaning   Follow by moisturizer every night and also every morning    Facial cleanser:       Moisturizers below:                           23   UROLOGY ADULT REFERRAL   [facility]  [phone] or [phone] (Physician hotline: [phone])  Fax: [phone]  Fax: [phone] (try this one if above fax fails)    Referral, demographics faxed to [phone]. They will review referral and contact pt to schedule.  (Note: Schedulers have access to Epic.)    Chelsea Oneil

## 2023-03-05 LAB — BACTERIA UR CULT: NORMAL

## 2023-03-06 RX ORDER — ADAPALENE 45 G/G
GEL TOPICAL AT BEDTIME
Qty: 45 G | Refills: 3 | Status: SHIPPED | OUTPATIENT
Start: 2023-03-06

## 2023-05-24 ENCOUNTER — OFFICE VISIT (OUTPATIENT)
Dept: NEUROLOGY | Facility: CLINIC | Age: 23
End: 2023-05-24
Payer: COMMERCIAL

## 2023-05-24 ENCOUNTER — LAB (OUTPATIENT)
Dept: LAB | Facility: HOSPITAL | Age: 23
End: 2023-05-24
Payer: COMMERCIAL

## 2023-05-24 VITALS — DIASTOLIC BLOOD PRESSURE: 87 MMHG | SYSTOLIC BLOOD PRESSURE: 124 MMHG | HEART RATE: 76 BPM

## 2023-05-24 DIAGNOSIS — G40.509 NONINTRACTABLE EPILEPSY DUE TO EXTERNAL CAUSES, WITHOUT STATUS EPILEPTICUS (H): ICD-10-CM

## 2023-05-24 DIAGNOSIS — G40.509 NONINTRACTABLE EPILEPSY DUE TO EXTERNAL CAUSES, WITHOUT STATUS EPILEPTICUS (H): Primary | ICD-10-CM

## 2023-05-24 DIAGNOSIS — S06.9X9D TRAUMATIC BRAIN INJURY WITH LOSS OF CONSCIOUSNESS, SUBSEQUENT ENCOUNTER: ICD-10-CM

## 2023-05-24 DIAGNOSIS — R51.9 NONINTRACTABLE HEADACHE, UNSPECIFIED CHRONICITY PATTERN, UNSPECIFIED HEADACHE TYPE: ICD-10-CM

## 2023-05-24 LAB
ANION GAP SERPL CALCULATED.3IONS-SCNC: 10 MMOL/L (ref 7–15)
BASOPHILS # BLD AUTO: 0 10E3/UL (ref 0–0.2)
BASOPHILS NFR BLD AUTO: 0 %
BUN SERPL-MCNC: 8.4 MG/DL (ref 6–20)
CALCIUM SERPL-MCNC: 9.7 MG/DL (ref 8.6–10)
CHLORIDE SERPL-SCNC: 101 MMOL/L (ref 98–107)
CREAT SERPL-MCNC: 0.86 MG/DL (ref 0.67–1.17)
DEPRECATED HCO3 PLAS-SCNC: 28 MMOL/L (ref 22–29)
EOSINOPHIL # BLD AUTO: 0.1 10E3/UL (ref 0–0.7)
EOSINOPHIL NFR BLD AUTO: 1 %
ERYTHROCYTE [DISTWIDTH] IN BLOOD BY AUTOMATED COUNT: 12.7 % (ref 10–15)
GFR SERPL CREATININE-BSD FRML MDRD: >90 ML/MIN/1.73M2
GLUCOSE SERPL-MCNC: 89 MG/DL (ref 70–99)
HCT VFR BLD AUTO: 48 % (ref 40–53)
HGB BLD-MCNC: 16.3 G/DL (ref 13.3–17.7)
IMM GRANULOCYTES # BLD: 0 10E3/UL
IMM GRANULOCYTES NFR BLD: 0 %
LYMPHOCYTES # BLD AUTO: 1.2 10E3/UL (ref 0.8–5.3)
LYMPHOCYTES NFR BLD AUTO: 26 %
MCH RBC QN AUTO: 30.9 PG (ref 26.5–33)
MCHC RBC AUTO-ENTMCNC: 34 G/DL (ref 31.5–36.5)
MCV RBC AUTO: 91 FL (ref 78–100)
MONOCYTES # BLD AUTO: 0.3 10E3/UL (ref 0–1.3)
MONOCYTES NFR BLD AUTO: 7 %
NEUTROPHILS # BLD AUTO: 3 10E3/UL (ref 1.6–8.3)
NEUTROPHILS NFR BLD AUTO: 66 %
NRBC # BLD AUTO: 0 10E3/UL
NRBC BLD AUTO-RTO: 0 /100
PLATELET # BLD AUTO: 256 10E3/UL (ref 150–450)
POTASSIUM SERPL-SCNC: 4.1 MMOL/L (ref 3.4–5.3)
RBC # BLD AUTO: 5.27 10E6/UL (ref 4.4–5.9)
SODIUM SERPL-SCNC: 139 MMOL/L (ref 136–145)
WBC # BLD AUTO: 4.7 10E3/UL (ref 4–11)

## 2023-05-24 PROCEDURE — 80183 DRUG SCRN QUANT OXCARBAZEPIN: CPT

## 2023-05-24 PROCEDURE — 36415 COLL VENOUS BLD VENIPUNCTURE: CPT

## 2023-05-24 PROCEDURE — 85025 COMPLETE CBC W/AUTO DIFF WBC: CPT

## 2023-05-24 PROCEDURE — 99214 OFFICE O/P EST MOD 30 MIN: CPT | Performed by: PSYCHIATRY & NEUROLOGY

## 2023-05-24 PROCEDURE — 80048 BASIC METABOLIC PNL TOTAL CA: CPT

## 2023-05-24 RX ORDER — DIAZEPAM ORAL SOLUTION (CONCENTRATE) 5 MG/ML
SOLUTION ORAL
Qty: 30 ML | Refills: 0 | Status: SHIPPED | OUTPATIENT
Start: 2023-05-24 | End: 2024-05-01

## 2023-05-24 RX ORDER — OXCARBAZEPINE 300 MG/1
900 TABLET, FILM COATED ORAL 2 TIMES DAILY
Qty: 540 TABLET | Refills: 3 | Status: SHIPPED | OUTPATIENT
Start: 2023-05-24 | End: 2024-03-07
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
## 2023-05-24 NOTE — NURSING NOTE
"Jareth Hernandez is a 22 year old male who presents for:  Chief Complaint   Patient presents with     Follow Up     No concerns        Initial Vitals:  /87   Pulse 76  Estimated body mass index is 20.7 kg/m  as calculated from the following:    Height as of 3/3/23: 1.619 m (5' 3.74\").    Weight as of 3/3/23: 54.3 kg (119 lb 9.6 oz).. There is no height or weight on file to calculate BSA. BP completed using cuff size: wrist cuff    Zafar Alvarado  "

## 2023-05-26 LAB — 10OH-CARBAZEPINE SERPL-MCNC: 17 UG/ML

## 2023-08-11 ENCOUNTER — LAB (OUTPATIENT)
Dept: LAB | Facility: CLINIC | Age: 23
End: 2023-08-11
Payer: COMMERCIAL

## 2023-08-11 DIAGNOSIS — G40.509 NONINTRACTABLE EPILEPSY DUE TO EXTERNAL CAUSES, WITHOUT STATUS EPILEPTICUS (H): ICD-10-CM

## 2023-08-11 PROCEDURE — 36415 COLL VENOUS BLD VENIPUNCTURE: CPT

## 2023-08-11 PROCEDURE — 99000 SPECIMEN HANDLING OFFICE-LAB: CPT

## 2023-08-11 PROCEDURE — 80183 DRUG SCRN QUANT OXCARBAZEPIN: CPT | Mod: 90

## 2023-08-13 LAB — 10OH-CARBAZEPINE SERPL-MCNC: <1 UG/ML

## 2023-08-17 ENCOUNTER — TELEPHONE (OUTPATIENT)
Dept: NEUROLOGY | Facility: CLINIC | Age: 23
End: 2023-08-17
Payer: COMMERCIAL

## 2023-08-17 NOTE — TELEPHONE ENCOUNTER
Called and spoke with patient with  services. Patient states that he was only taking 2 tablets twice a day. I let him know that the script was for 3 tablets twice a day. He stated that he will take the medication that way now.

## 2023-08-17 NOTE — TELEPHONE ENCOUNTER
----- Message from Ezequiel Vasquez MD sent at 8/14/2023 11:24 AM CDT -----  The seizure medication level looks low.  Please check with patient if they are taking the medication correctly.

## 2024-03-07 ENCOUNTER — OFFICE VISIT (OUTPATIENT)
Dept: FAMILY MEDICINE | Facility: CLINIC | Age: 24
End: 2024-03-07
Payer: COMMERCIAL

## 2024-03-07 VITALS
OXYGEN SATURATION: 97 % | HEIGHT: 64 IN | DIASTOLIC BLOOD PRESSURE: 84 MMHG | HEART RATE: 94 BPM | TEMPERATURE: 98.7 F | RESPIRATION RATE: 20 BRPM | WEIGHT: 138 LBS | SYSTOLIC BLOOD PRESSURE: 135 MMHG | BODY MASS INDEX: 23.56 KG/M2

## 2024-03-07 DIAGNOSIS — G40.509 NONINTRACTABLE EPILEPSY DUE TO EXTERNAL CAUSES, WITHOUT STATUS EPILEPTICUS (H): ICD-10-CM

## 2024-03-07 DIAGNOSIS — K21.9 GASTROESOPHAGEAL REFLUX DISEASE WITHOUT ESOPHAGITIS: Primary | ICD-10-CM

## 2024-03-07 LAB
BASOPHILS # BLD AUTO: 0 10E3/UL (ref 0–0.2)
BASOPHILS NFR BLD AUTO: 0 %
EOSINOPHIL # BLD AUTO: 0 10E3/UL (ref 0–0.7)
EOSINOPHIL NFR BLD AUTO: 0 %
ERYTHROCYTE [DISTWIDTH] IN BLOOD BY AUTOMATED COUNT: 11.9 % (ref 10–15)
HCT VFR BLD AUTO: 44 % (ref 40–53)
HGB BLD-MCNC: 15.4 G/DL (ref 13.3–17.7)
IMM GRANULOCYTES # BLD: 0 10E3/UL
IMM GRANULOCYTES NFR BLD: 0 %
LYMPHOCYTES # BLD AUTO: 1 10E3/UL (ref 0.8–5.3)
LYMPHOCYTES NFR BLD AUTO: 13 %
MCH RBC QN AUTO: 31.4 PG (ref 26.5–33)
MCHC RBC AUTO-ENTMCNC: 35 G/DL (ref 31.5–36.5)
MCV RBC AUTO: 90 FL (ref 78–100)
MONOCYTES # BLD AUTO: 0.4 10E3/UL (ref 0–1.3)
MONOCYTES NFR BLD AUTO: 5 %
NEUTROPHILS # BLD AUTO: 6.1 10E3/UL (ref 1.6–8.3)
NEUTROPHILS NFR BLD AUTO: 81 %
PLATELET # BLD AUTO: 292 10E3/UL (ref 150–450)
RBC # BLD AUTO: 4.91 10E6/UL (ref 4.4–5.9)
WBC # BLD AUTO: 7.5 10E3/UL (ref 4–11)

## 2024-03-07 PROCEDURE — 99214 OFFICE O/P EST MOD 30 MIN: CPT | Mod: GC

## 2024-03-07 PROCEDURE — 85025 COMPLETE CBC W/AUTO DIFF WBC: CPT

## 2024-03-07 PROCEDURE — 36415 COLL VENOUS BLD VENIPUNCTURE: CPT

## 2024-03-07 PROCEDURE — 80053 COMPREHEN METABOLIC PANEL: CPT

## 2024-03-07 PROCEDURE — 83690 ASSAY OF LIPASE: CPT

## 2024-03-07 RX ORDER — ONDANSETRON 4 MG/1
4 TABLET, ORALLY DISINTEGRATING ORAL EVERY 8 HOURS PRN
Qty: 30 TABLET | Refills: 0 | Status: SHIPPED | OUTPATIENT
Start: 2024-03-07 | End: 2024-03-13

## 2024-03-07 RX ORDER — OXCARBAZEPINE 300 MG/1
900 TABLET, FILM COATED ORAL 2 TIMES DAILY
Qty: 540 TABLET | Refills: 3 | Status: SHIPPED | OUTPATIENT
Start: 2024-03-07 | End: 2024-05-01

## 2024-03-07 NOTE — PROGRESS NOTES
Assessment & Plan     Gastroesophageal reflux disease without esophagitis  5-day history of pain in the epigastrium, nausea, occasional vomiting after eating.  Has had 2 to 3 bowel movements during this time period but overall feels bloated.  Has never had these symptoms in the past.  Physical exam notable for normal bowel sounds in all quadrants and mild tenderness to palpation in the epigastrium.  Patient points to pain in the epigastrium just below the xiphoid process which causes a burning sensation up into his chest and throat occasionally.  High suspicion for GERD.  However given the significance of the symptoms, labs drawn for further workup of other abdominal etiologies such as pathology in the liver, gallbladder, pancreas.  Also advised close follow-up to ensure resolution of symptoms given their significance.  Therefore gave patient just 1 week course of omeprazole 20 mg twice daily with instruction to follow-up in 1 week.  Also gave Zofran for nausea; patient has had some difficulty taking his medications including antiseizure medications, so encouraged him to use the Zofran to help with this.  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  Normally refilled by Dr. Vasquez, his neurologist.  Will provide refills today.  Next appointment with Dr. Vasquez appears to be scheduled for 5/1/2024.  - OXcarbazepine (TRILEPTAL) 300 MG tablet; Take 3 tablets (900 mg) by mouth 2 times daily    Return in about 1 week (around 3/14/2024) for Follow up.    Subjective   Htee is a 23 year old, presenting for the following health issues:  Abdominal Pain (X 5 days now- does notice a  "bitter/sour taste coming up his throat), Nausea, and Vomiting        3/7/2024    12:36 PM   Additional Questions   Roomed by Mao   Accompanied by self         3/7/2024    Information    services provided? Yes   Hilario Siu   Type of interpretation provided Face-to-face    name Gordo Dewitt    [employer]    phone number [phone]     HPI   Patient presents today for evaluation of abdominal pain, nausea, vomiting for 5 days.  It is present in the upper abdomen just below the xiphoid process.  It sometimes radiates up into the chest and throat and is a burning sensation.  Pain is essentially constant but is worse when lying down.  Also endorsing nausea and vomiting.  Vomiting sometimes occurring after eating.  Overall has been able to hold down a little bit of food and fluids.  Has missed his antiseizure medications for the past 2 days or so due to nausea.  Denies any fevers, chills, sweats.  No one else with similar symptoms.  Never had anything like this in the past.  Has not tried any medication for this.  Has had 2-3 bowel movements during this time but does feel bloating and fullness in his abdomen.  Denies any significant alcohol use, rare caffeine use, no recent travel, no new pets.    Review of Systems  CONSTITUTIONAL: NEGATIVE for fever, chills, change in weight  ENT/MOUTH: NEGATIVE for ear, mouth and throat problems  RESP: NEGATIVE for significant cough or SOB  CV: NEGATIVE for chest pain, palpitations or peripheral edema  GI: POSITIVE for abdominal pain epigastric, heartburn, nausea, and vomiting, and NEGATIVE for constipation and diarrhea      Objective    /84   Pulse 94   Temp 98.7  F (37.1  C) (Oral)   Resp 20   Ht 1.626 m (5' 4\")   Wt 62.6 kg (138 lb)   SpO2 97%   BMI 23.69 kg/m    Body mass index is 23.69 kg/m .  Physical Exam   GENERAL: alert and no distress  RESP: lungs clear to auscultation - no rales, rhonchi or " wheezes  CV: regular rate and rhythm, no murmur  ABDOMEN: soft, no masses, bowel sounds normal, mild tenderness to palpation in the epigastrium but otherwise diffusely nontender to palpation  MS: no gross musculoskeletal defects noted, no edema  SKIN: no suspicious lesions or rashes on exposed skin  NEURO: normal strength and tone, mentation intact and speech normal  PSYCH: mentation appears normal, affect normal/bright    Results for orders placed or performed in visit on 03/07/24 (from the past 24 hour(s))   CBC with Platelets & Differential    Narrative    The following orders were created for panel order CBC with Platelets & Differential.  Procedure                               Abnormality         Status                     ---------                               -----------         ------                     CBC with platelets and d...[[id number]]                      Final result                 Please view results for these tests on the individual orders.   CBC with platelets and differential   Result Value Ref Range    WBC Count 7.5 4.0 - 11.0 10e3/uL    RBC Count 4.91 4.40 - 5.90 10e6/uL    Hemoglobin 15.4 13.3 - 17.7 g/dL    Hematocrit 44.0 40.0 - 53.0 %    MCV 90 78 - 100 fL    MCH 31.4 26.5 - 33.0 pg    MCHC 35.0 31.5 - 36.5 g/dL    RDW 11.9 10.0 - 15.0 %    Platelet Count 292 150 - 450 10e3/uL    % Neutrophils 81 %    % Lymphocytes 13 %    % Monocytes 5 %    % Eosinophils 0 %    % Basophils 0 %    % Immature Granulocytes 0 %    Absolute Neutrophils 6.1 1.6 - 8.3 10e3/uL    Absolute Lymphocytes 1.0 0.8 - 5.3 10e3/uL    Absolute Monocytes 0.4 0.0 - 1.3 10e3/uL    Absolute Eosinophils 0.0 0.0 - 0.7 10e3/uL    Absolute Basophils 0.0 0.0 - 0.2 10e3/uL    Absolute Immature Granulocytes 0.0 <=0.4 10e3/uL           Signed Electronically by: ADRI SALMERON MD

## 2024-03-07 NOTE — PROGRESS NOTES
Preceptor Attestation:    I discussed the patient with the resident and evaluated the patient in person. I have verified the content of the note, which accurately reflects my assessment of the patient and the plan of care.   Supervising Physician:  Kirby Young MD.

## 2024-03-08 LAB
ALBUMIN SERPL BCG-MCNC: 4.9 G/DL (ref 3.5–5.2)
ALP SERPL-CCNC: 97 U/L (ref 40–150)
ALT SERPL W P-5'-P-CCNC: 46 U/L (ref 0–70)
ANION GAP SERPL CALCULATED.3IONS-SCNC: 8 MMOL/L (ref 7–15)
AST SERPL W P-5'-P-CCNC: 40 U/L (ref 0–45)
BILIRUB SERPL-MCNC: 0.8 MG/DL
BUN SERPL-MCNC: 4.8 MG/DL (ref 6–20)
CALCIUM SERPL-MCNC: 9.7 MG/DL (ref 8.6–10)
CHLORIDE SERPL-SCNC: 100 MMOL/L (ref 98–107)
CREAT SERPL-MCNC: 0.88 MG/DL (ref 0.67–1.17)
DEPRECATED HCO3 PLAS-SCNC: 32 MMOL/L (ref 22–29)
EGFRCR SERPLBLD CKD-EPI 2021: >90 ML/MIN/1.73M2
GLUCOSE SERPL-MCNC: 118 MG/DL (ref 70–99)
LIPASE SERPL-CCNC: 30 U/L (ref 13–60)
POTASSIUM SERPL-SCNC: 3.8 MMOL/L (ref 3.4–5.3)
PROT SERPL-MCNC: 7.5 G/DL (ref 6.4–8.3)
SODIUM SERPL-SCNC: 140 MMOL/L (ref 135–145)

## 2024-03-11 NOTE — PROGRESS NOTES
Assessment & Plan     Gastroesophageal reflux disease without esophagitis  -Discussed with patient that given trial of omeprazole resulted in symptomatic resolution that I will send in a refill for a short course of omeprazole if patient has a recurrence of acid reflux type symptoms.  I discussed with patient that if a recurrence of acid reflux symptoms occurs recommend that he follow-up with our clinic for further evaluation and management.  Given at previous visit patient noted nausea and vomiting which have since resolved, suspect likely viral component relating to one-time episode of acid reflux symptoms.  Patient verbalized clear understanding and agreement to treatment plan and had no further questions or concerns at this time.  - omeprazole (PRILOSEC) 20 MG DR capsule  Dispense: 14 capsule; Refill: 0    Return if symptoms worsen or fail to improve.    Subjective   Htee is a 23 year old, presenting for the following health issues:  Abdominal Pain (Follow up abd pain- states it's better with the medicine)    HPI   -Patient was seen in clinic on 3/7/2024 with Dr. Hernandez.  He was noted to have at that time a 5-day history of epigastric pain with nausea and occasional postprandial vomiting.  Lab work obtained on 3/7/2024 including lipase, CBC, and CMP were reassuring.  Patient was given a 1 week supply of omeprazole twice daily and as needed Zofran for nausea.  He reports that his symptoms have significantly improved followed 1 week course of omeprazole.  No nausea or vomiting currently.  Denies headache, fever, chills, nausea, vomiting, shortness of breath, chest pain.  Reports no additional concerns at this time.    Review of Systems  Constitutional, HEENT, cardiovascular, pulmonary, gi and gu systems are negative, except as otherwise noted.  -ROS reviewed, see HPI for pertinent positives and negatives.  Seferino Mckeon DO      Objective    /86   Pulse 86   Temp 98.6  F (37  C) (Oral)   Resp 16   Ht  "1.626 m (5' 4\")   Wt 63.4 kg (139 lb 12.8 oz)   SpO2 98%   BMI 24.00 kg/m    Body mass index is 24 kg/m .  Physical Exam  Constitutional:       General: He is not in acute distress.     Appearance: Normal appearance. He is not toxic-appearing.   HENT:      Right Ear: External ear normal.      Left Ear: External ear normal.      Nose: Nose normal.      Mouth/Throat:      Mouth: Mucous membranes are moist.   Eyes:      Pupils: Pupils are equal, round, and reactive to light.   Pulmonary:      Effort: Pulmonary effort is normal. No respiratory distress.   Abdominal:      General: There is no distension.      Tenderness: There is no abdominal tenderness. There is no guarding or rebound.   Musculoskeletal:      Cervical back: No tenderness.   Neurological:      General: No focal deficit present.      Mental Status: He is alert and oriented to person, place, and time.   Psychiatric:         Mood and Affect: Mood normal.         Behavior: Behavior normal.         Office Visit on 03/07/2024   Component Date Value Ref Range Status    Sodium 03/07/2024 140  135 - 145 mmol/L Final    Reference intervals for this test were updated on 09/26/2023 to more accurately reflect our healthy population. There may be differences in the flagging of prior results with similar values performed with this method. Interpretation of those prior results can be made in the context of the updated reference intervals.     Potassium 03/07/2024 3.8  3.4 - 5.3 mmol/L Final    Carbon Dioxide (CO2) 03/07/2024 32 (H)  22 - 29 mmol/L Final    Anion Gap 03/07/2024 8  7 - 15 mmol/L Final    Urea Nitrogen 03/07/2024 4.8 (L)  6.0 - 20.0 mg/dL Final    Creatinine 03/07/2024 0.88  0.67 - 1.17 mg/dL Final    GFR Estimate 03/07/2024 >90  >60 mL/min/1.73m2 Final    Calcium 03/07/2024 9.7  8.6 - 10.0 mg/dL Final    Chloride 03/07/2024 100  98 - 107 mmol/L Final    Glucose 03/07/2024 118 (H)  70 - 99 mg/dL Final    Alkaline Phosphatase 03/07/2024 97  40 - 150 U/L " Final    Reference intervals for this test were updated on 11/14/2023 to more accurately reflect our healthy population. There may be differences in the flagging of prior results with similar values performed with this method. Interpretation of those prior results can be made in the context of the updated reference intervals.    AST 03/07/2024 40  0 - 45 U/L Final    Reference intervals for this test were updated on 6/12/2023 to more accurately reflect our healthy population. There may be differences in the flagging of prior results with similar values performed with this method. Interpretation of those prior results can be made in the context of the updated reference intervals.    ALT 03/07/2024 46  0 - 70 U/L Final    Reference intervals for this test were updated on 6/12/2023 to more accurately reflect our healthy population. There may be differences in the flagging of prior results with similar values performed with this method. Interpretation of those prior results can be made in the context of the updated reference intervals.      Protein Total 03/07/2024 7.5  6.4 - 8.3 g/dL Final    Albumin 03/07/2024 4.9  3.5 - 5.2 g/dL Final    Bilirubin Total 03/07/2024 0.8  <=1.2 mg/dL Final    Lipase 03/07/2024 30  13 - 60 U/L Final    WBC Count 03/07/2024 7.5  4.0 - 11.0 10e3/uL Final    RBC Count 03/07/2024 4.91  4.40 - 5.90 10e6/uL Final    Hemoglobin 03/07/2024 15.4  13.3 - 17.7 g/dL Final    Hematocrit 03/07/2024 44.0  40.0 - 53.0 % Final    MCV 03/07/2024 90  78 - 100 fL Final    MCH 03/07/2024 31.4  26.5 - 33.0 pg Final    MCHC 03/07/2024 35.0  31.5 - 36.5 g/dL Final    RDW 03/07/2024 11.9  10.0 - 15.0 % Final    Platelet Count 03/07/2024 292  150 - 450 10e3/uL Final    % Neutrophils 03/07/2024 81  % Final    % Lymphocytes 03/07/2024 13  % Final    % Monocytes 03/07/2024 5  % Final    % Eosinophils 03/07/2024 0  % Final    % Basophils 03/07/2024 0  % Final    % Immature Granulocytes 03/07/2024 0  % Final     Absolute Neutrophils 03/07/2024 6.1  1.6 - 8.3 10e3/uL Final    Absolute Lymphocytes 03/07/2024 1.0  0.8 - 5.3 10e3/uL Final    Absolute Monocytes 03/07/2024 0.4  0.0 - 1.3 10e3/uL Final    Absolute Eosinophils 03/07/2024 0.0  0.0 - 0.7 10e3/uL Final    Absolute Basophils 03/07/2024 0.0  0.0 - 0.2 10e3/uL Final    Absolute Immature Granulocytes 03/07/2024 0.0  <=0.4 10e3/uL Final         Precepted with Dr. Gilda Frank MD    Signed Electronically by: Seferino Mckeon DO

## 2024-03-13 ENCOUNTER — OFFICE VISIT (OUTPATIENT)
Dept: FAMILY MEDICINE | Facility: CLINIC | Age: 24
End: 2024-03-13
Payer: COMMERCIAL

## 2024-03-13 VITALS
TEMPERATURE: 98.6 F | HEIGHT: 64 IN | RESPIRATION RATE: 16 BRPM | DIASTOLIC BLOOD PRESSURE: 86 MMHG | WEIGHT: 139.8 LBS | HEART RATE: 86 BPM | BODY MASS INDEX: 23.87 KG/M2 | OXYGEN SATURATION: 98 % | SYSTOLIC BLOOD PRESSURE: 137 MMHG

## 2024-03-13 DIAGNOSIS — K21.9 GASTROESOPHAGEAL REFLUX DISEASE WITHOUT ESOPHAGITIS: Primary | ICD-10-CM

## 2024-03-13 PROCEDURE — 99213 OFFICE O/P EST LOW 20 MIN: CPT | Mod: GC

## 2024-03-13 NOTE — PROGRESS NOTES
Preceptor Attestation:    I discussed the patient with the resident and evaluated the patient in person. I have verified the content of the note, which accurately reflects my assessment of the patient and the plan of care.   Supervising Physician:  Gilda Frank MD

## 2024-05-01 ENCOUNTER — OFFICE VISIT (OUTPATIENT)
Dept: NEUROLOGY | Facility: CLINIC | Age: 24
End: 2024-05-01
Payer: COMMERCIAL

## 2024-05-01 VITALS
SYSTOLIC BLOOD PRESSURE: 120 MMHG | BODY MASS INDEX: 23.86 KG/M2 | WEIGHT: 139 LBS | HEART RATE: 76 BPM | RESPIRATION RATE: 16 BRPM | DIASTOLIC BLOOD PRESSURE: 87 MMHG

## 2024-05-01 DIAGNOSIS — S06.9X9D TRAUMATIC BRAIN INJURY WITH LOSS OF CONSCIOUSNESS, SUBSEQUENT ENCOUNTER: ICD-10-CM

## 2024-05-01 DIAGNOSIS — G40.509 NONINTRACTABLE EPILEPSY DUE TO EXTERNAL CAUSES, WITHOUT STATUS EPILEPTICUS (H): Primary | ICD-10-CM

## 2024-05-01 DIAGNOSIS — R51.9 NONINTRACTABLE HEADACHE, UNSPECIFIED CHRONICITY PATTERN, UNSPECIFIED HEADACHE TYPE: ICD-10-CM

## 2024-05-01 PROCEDURE — 99214 OFFICE O/P EST MOD 30 MIN: CPT | Performed by: PSYCHIATRY & NEUROLOGY

## 2024-05-01 RX ORDER — DIAZEPAM ORAL SOLUTION (CONCENTRATE) 5 MG/ML
SOLUTION ORAL
Qty: 30 ML | Refills: 0 | Status: SHIPPED | OUTPATIENT
Start: 2024-05-01

## 2024-05-01 RX ORDER — OXCARBAZEPINE 300 MG/1
900 TABLET, FILM COATED ORAL 2 TIMES DAILY
Qty: 540 TABLET | Refills: 3 | Status: SHIPPED | OUTPATIENT
Start: 2024-05-01
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
## 2024-05-13 ENCOUNTER — LAB (OUTPATIENT)
Dept: LAB | Facility: HOSPITAL | Age: 24
End: 2024-05-13
Payer: COMMERCIAL

## 2024-05-13 DIAGNOSIS — G40.509 NONINTRACTABLE EPILEPSY DUE TO EXTERNAL CAUSES, WITHOUT STATUS EPILEPTICUS (H): ICD-10-CM

## 2024-05-13 PROCEDURE — 80183 DRUG SCRN QUANT OXCARBAZEPIN: CPT

## 2024-05-13 PROCEDURE — 36415 COLL VENOUS BLD VENIPUNCTURE: CPT

## 2024-05-16 LAB — 10OH-CARBAZEPINE SERPL-MCNC: 33 UG/ML

## 2025-03-12 ENCOUNTER — OFFICE VISIT (OUTPATIENT)
Dept: FAMILY MEDICINE | Facility: CLINIC | Age: 25
End: 2025-03-12
Payer: COMMERCIAL

## 2025-03-12 VITALS
TEMPERATURE: 98.6 F | BODY MASS INDEX: 24.37 KG/M2 | WEIGHT: 142 LBS | DIASTOLIC BLOOD PRESSURE: 87 MMHG | SYSTOLIC BLOOD PRESSURE: 132 MMHG | OXYGEN SATURATION: 98 % | HEART RATE: 88 BPM | RESPIRATION RATE: 16 BRPM

## 2025-03-12 DIAGNOSIS — Z65.3 LEGAL PROBLEM: Primary | ICD-10-CM

## 2025-03-12 DIAGNOSIS — G40.509 NONINTRACTABLE EPILEPSY DUE TO EXTERNAL CAUSES, WITHOUT STATUS EPILEPTICUS (H): ICD-10-CM
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
## 2025-03-26 ENCOUNTER — OFFICE VISIT (OUTPATIENT)
Dept: FAMILY MEDICINE | Facility: CLINIC | Age: 25
End: 2025-03-26
Payer: COMMERCIAL

## 2025-03-26 VITALS
SYSTOLIC BLOOD PRESSURE: 134 MMHG | OXYGEN SATURATION: 98 % | RESPIRATION RATE: 16 BRPM | HEART RATE: 103 BPM | HEIGHT: 64 IN | WEIGHT: 152 LBS | DIASTOLIC BLOOD PRESSURE: 81 MMHG | TEMPERATURE: 98.4 F | BODY MASS INDEX: 25.95 KG/M2

## 2025-03-26 DIAGNOSIS — Z65.3 LEGAL PROBLEM: Primary | ICD-10-CM

## 2025-04-22 NOTE — PROGRESS NOTES
The longitudinal plan of care for the diagnosis(es)/condition(s) as documented were addressed during this visit. Due to the added complexity in care, I will continue to support him in the subsequent management and with ongoing continuity of care.  Diagnosis or treatment significantly limited by social determinants of health - low health literacy  30 minutes spent by me on the date of the encounter doing chart review, patient visit, documentation.  Assessment & Plan  Legal problem  I printed off instructions on getting this problem resolved with the Social Security office and I asked the  to give him a call to clarify this further.       Traumatic brain injury with loss of consciousness, subsequent encounter         Acquired intellectual disability  This person is, in my estimation, competent to make ordinary financial decisions and should be able to be his own payee for Social Security benefits.          Assessment & Plan  Thank you for trusting us with your health care. Here are the things we discussed at today's visit.    The transcript provided does not contain any medical discussion or assessment related to health conditions. It primarily involves a conversation about legal and financial matters concerning Social Security and representative payee issues. Therefore, there are no medical conditions to assess or plans to recommend based on the transcript.    Assessment and plan section for this encounter:     Patient Instructions   Go to social security and have the father removed as the representative payee.     is Inocencia Frias from [facility] [employer] [facility]: [phone]             Based on our discussion, I have outlined the following instructions for you:        Thank you again for your visit, and we look forward to supporting you in your journey to better health.     Subjective   Htee is a 24 year old, presenting for the following health issues:  Other (Follow up legal  work )      4/23/2025     2:04 PM   Additional Questions   Roomed by Erinn   Accompanied by self         4/23/2025    Information    services provided? Yes   Hilario Siu   Type of interpretation provided Face-to-face    [facility]    [employer]    phone number [phone]     HPI Patient, 24 years    Misc  - Discussed issues related to Social Security and representative payee status with his father.  - Has visited the Social Security office in [place] a couple of times.  - Mentioned a past experience where his sister, who is disabled, had a change in payee status after marriage.  - Described a situation where his father is currently the representative payee, and there is a need to change this status.  - No symptoms or medical concerns were reported during the encounter.          Objective    /86   Pulse 83   Temp 97.8  F (36.6  C) (Oral)   Resp 16   Wt 66.5 kg (146 lb 9.6 oz)   SpO2 98%   BMI 25.16 kg/m    Body mass index is 25.16 kg/m .  Physical Exam   GENERAL: alert and no distress  MS: no gross musculoskeletal defects noted, no edema            Signed Electronically by: Eriberto Batista MD

## 2025-04-23 ENCOUNTER — OFFICE VISIT (OUTPATIENT)
Dept: FAMILY MEDICINE | Facility: CLINIC | Age: 25
End: 2025-04-23
Payer: COMMERCIAL

## 2025-04-23 VITALS
HEART RATE: 83 BPM | RESPIRATION RATE: 16 BRPM | WEIGHT: 146.6 LBS | OXYGEN SATURATION: 98 % | BODY MASS INDEX: 25.16 KG/M2 | SYSTOLIC BLOOD PRESSURE: 127 MMHG | TEMPERATURE: 97.8 F | DIASTOLIC BLOOD PRESSURE: 86 MMHG

## 2025-04-23 DIAGNOSIS — Z65.3 LEGAL PROBLEM: Primary | ICD-10-CM

## 2025-04-23 DIAGNOSIS — S06.9X9D TRAUMATIC BRAIN INJURY WITH LOSS OF CONSCIOUSNESS, SUBSEQUENT ENCOUNTER: ICD-10-CM

## 2025-04-23 DIAGNOSIS — F79 ACQUIRED INTELLECTUAL DISABILITY: ICD-10-CM

## 2025-04-23 NOTE — PATIENT INSTRUCTIONS
Go to social security and have the father removed as the representative payee.     is Inocencia Frias from [facility] Services: [phone]

## 2025-04-23 NOTE — ASSESSMENT & PLAN NOTE
This person is, in my estimation, competent to make ordinary financial decisions and should be able to be his own payee for Social Security benefits.

## 2025-05-01 ENCOUNTER — LAB (OUTPATIENT)
Dept: LAB | Facility: HOSPITAL | Age: 25
End: 2025-05-01
Payer: COMMERCIAL

## 2025-05-01 ENCOUNTER — OFFICE VISIT (OUTPATIENT)
Dept: NEUROLOGY | Facility: CLINIC | Age: 25
End: 2025-05-01
Payer: COMMERCIAL

## 2025-05-01 VITALS
HEIGHT: 64 IN | HEART RATE: 88 BPM | SYSTOLIC BLOOD PRESSURE: 131 MMHG | BODY MASS INDEX: 25.61 KG/M2 | WEIGHT: 150 LBS | DIASTOLIC BLOOD PRESSURE: 94 MMHG

## 2025-05-01 DIAGNOSIS — S06.9X9D TRAUMATIC BRAIN INJURY WITH LOSS OF CONSCIOUSNESS, SUBSEQUENT ENCOUNTER: Primary | ICD-10-CM

## 2025-05-01 DIAGNOSIS — R51.9 NONINTRACTABLE HEADACHE, UNSPECIFIED CHRONICITY PATTERN, UNSPECIFIED HEADACHE TYPE: ICD-10-CM

## 2025-05-01 DIAGNOSIS — G40.509 NONINTRACTABLE EPILEPSY DUE TO EXTERNAL CAUSES, WITHOUT STATUS EPILEPTICUS (H): ICD-10-CM

## 2025-05-01 LAB
ANION GAP SERPL CALCULATED.3IONS-SCNC: 11 MMOL/L (ref 7–15)
BASOPHILS # BLD AUTO: 0.1 10E3/UL (ref 0–0.2)
BASOPHILS NFR BLD AUTO: 1 %
BUN SERPL-MCNC: 16.3 MG/DL (ref 6–20)
CALCIUM SERPL-MCNC: 10.4 MG/DL (ref 8.8–10.4)
CHLORIDE SERPL-SCNC: 101 MMOL/L (ref 98–107)
CREAT SERPL-MCNC: 0.89 MG/DL (ref 0.67–1.17)
EGFRCR SERPLBLD CKD-EPI 2021: >90 ML/MIN/1.73M2
EOSINOPHIL # BLD AUTO: 0.1 10E3/UL (ref 0–0.7)
EOSINOPHIL NFR BLD AUTO: 1 %
ERYTHROCYTE [DISTWIDTH] IN BLOOD BY AUTOMATED COUNT: 11.9 % (ref 10–15)
GLUCOSE SERPL-MCNC: 81 MG/DL (ref 70–99)
HCO3 SERPL-SCNC: 29 MMOL/L (ref 22–29)
HCT VFR BLD AUTO: 44.7 % (ref 40–53)
HGB BLD-MCNC: 15.1 G/DL (ref 13.3–17.7)
IMM GRANULOCYTES # BLD: 0 10E3/UL
IMM GRANULOCYTES NFR BLD: 0 %
LYMPHOCYTES # BLD AUTO: 1.4 10E3/UL (ref 0.8–5.3)
LYMPHOCYTES NFR BLD AUTO: 21 %
MCH RBC QN AUTO: 30.8 PG (ref 26.5–33)
MCHC RBC AUTO-ENTMCNC: 33.8 G/DL (ref 31.5–36.5)
MCV RBC AUTO: 91 FL (ref 78–100)
MONOCYTES # BLD AUTO: 0.6 10E3/UL (ref 0–1.3)
MONOCYTES NFR BLD AUTO: 9 %
NEUTROPHILS # BLD AUTO: 4.6 10E3/UL (ref 1.6–8.3)
NEUTROPHILS NFR BLD AUTO: 68 %
NRBC # BLD AUTO: 0 10E3/UL
NRBC BLD AUTO-RTO: 0 /100
PLATELET # BLD AUTO: 297 10E3/UL (ref 150–450)
POTASSIUM SERPL-SCNC: 4 MMOL/L (ref 3.4–5.3)
RBC # BLD AUTO: 4.9 10E6/UL (ref 4.4–5.9)
SODIUM SERPL-SCNC: 141 MMOL/L (ref 135–145)
WBC # BLD AUTO: 6.7 10E3/UL (ref 4–11)

## 2025-05-01 PROCEDURE — 36415 COLL VENOUS BLD VENIPUNCTURE: CPT

## 2025-05-01 PROCEDURE — 80048 BASIC METABOLIC PNL TOTAL CA: CPT

## 2025-05-01 PROCEDURE — 80183 DRUG SCRN QUANT OXCARBAZEPIN: CPT

## 2025-05-01 PROCEDURE — 85004 AUTOMATED DIFF WBC COUNT: CPT

## 2025-05-01 RX ORDER — DIAZEPAM ORAL SOLUTION (CONCENTRATE) 5 MG/ML
SOLUTION ORAL
Qty: 30 ML | Refills: 0 | Status: SHIPPED | OUTPATIENT
Start: 2025-05-01

## 2025-05-01 RX ORDER — OXCARBAZEPINE 300 MG/1
900 TABLET, FILM COATED ORAL 2 TIMES DAILY
Qty: 540 TABLET | Refills: 3 | Status: SHIPPED | OUTPATIENT
Start: 2025-05-01

## 2025-05-01 NOTE — NURSING NOTE
Chief Complaint   Patient presents with    Seizures     Annual follow up. Pt states he has not had any seizures. He would like a refill of diazepam (VALIUM) 5 MG/ML (HIGH CONC) solution, previous one .    Headache     Headaches are only once in awhile now.     Arlene Porter LPN on 2025 at 11:39 AM
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
## 2025-05-02 LAB — 10OH-CARBAZEPINE SERPL-MCNC: 16.2 UG/ML

## 2025-08-19 ENCOUNTER — VIRTUAL VISIT (OUTPATIENT)
Dept: INTERPRETER SERVICES | Facility: CLINIC | Age: 25
End: 2025-08-19

## 2025-08-19 ENCOUNTER — OFFICE VISIT (OUTPATIENT)
Dept: FAMILY MEDICINE | Facility: CLINIC | Age: 25
End: 2025-08-19
Payer: COMMERCIAL

## 2025-08-19 VITALS
SYSTOLIC BLOOD PRESSURE: 134 MMHG | DIASTOLIC BLOOD PRESSURE: 87 MMHG | BODY MASS INDEX: 24.86 KG/M2 | HEART RATE: 85 BPM | HEIGHT: 64 IN | RESPIRATION RATE: 16 BRPM | TEMPERATURE: 98.4 F | WEIGHT: 145.6 LBS | OXYGEN SATURATION: 97 %

## 2025-08-19 DIAGNOSIS — Z87.820 HISTORY OF TRAUMATIC BRAIN INJURY: ICD-10-CM

## 2025-08-19 DIAGNOSIS — Z02.9 ADMINISTRATIVE ENCOUNTER: Primary | ICD-10-CM

## 2025-08-19 PROBLEM — S06.9X9D TRAUMATIC BRAIN INJURY WITH LOSS OF CONSCIOUSNESS, SUBSEQUENT ENCOUNTER: Status: RESOLVED | Noted: 2018-11-13 | Resolved: 2025-08-19

## 2025-08-29 DIAGNOSIS — G40.509 NONINTRACTABLE EPILEPSY DUE TO EXTERNAL CAUSES, WITHOUT STATUS EPILEPTICUS (H): ICD-10-CM

## 2025-09-02 RX ORDER — DIAZEPAM ORAL SOLUTION (CONCENTRATE) 5 MG/ML
SOLUTION ORAL
Qty: 30 ML | Refills: 0 | Status: SHIPPED | OUTPATIENT
Start: 2025-09-02